# Patient Record
Sex: FEMALE | Race: WHITE | NOT HISPANIC OR LATINO | Employment: OTHER | URBAN - METROPOLITAN AREA
[De-identification: names, ages, dates, MRNs, and addresses within clinical notes are randomized per-mention and may not be internally consistent; named-entity substitution may affect disease eponyms.]

---

## 2022-12-07 RX ORDER — HYDROCHLOROTHIAZIDE 25 MG/1
25 TABLET ORAL DAILY
COMMUNITY

## 2022-12-07 RX ORDER — CARBIDOPA AND LEVODOPA 25; 100 MG/1; MG/1
3 TABLET ORAL 4 TIMES DAILY
COMMUNITY

## 2022-12-07 RX ORDER — ACETAMINOPHEN 500 MG
1000 TABLET ORAL EVERY 6 HOURS PRN
Status: ON HOLD | COMMUNITY
End: 2022-12-22

## 2022-12-07 RX ORDER — TURMERIC ROOT EXTRACT 500 MG
1 TABLET ORAL DAILY
COMMUNITY

## 2022-12-07 RX ORDER — GLATIRAMER ACETATE 20 MG/ML
20 INJECTION, SOLUTION SUBCUTANEOUS EVERY EVENING
COMMUNITY

## 2022-12-07 RX ORDER — BACLOFEN 10 MG/1
5-10 TABLET ORAL DAILY PRN
COMMUNITY

## 2022-12-07 RX ORDER — HYDROXYZINE PAMOATE 25 MG/1
25 CAPSULE ORAL 3 TIMES DAILY PRN
Status: ON HOLD | COMMUNITY
End: 2022-12-22

## 2022-12-07 RX ORDER — PRAMIPEXOLE DIHYDROCHLORIDE 0.12 MG/1
0.12 TABLET ORAL 3 TIMES DAILY
COMMUNITY

## 2022-12-07 RX ORDER — ZOLPIDEM TARTRATE 10 MG/1
10 TABLET ORAL AT BEDTIME
COMMUNITY

## 2022-12-07 RX ORDER — ATORVASTATIN CALCIUM 10 MG/1
10 TABLET, FILM COATED ORAL DAILY
COMMUNITY

## 2022-12-07 RX ORDER — SERTRALINE HYDROCHLORIDE 100 MG/1
100 TABLET, FILM COATED ORAL DAILY
COMMUNITY

## 2022-12-07 RX ORDER — AMLODIPINE BESYLATE 5 MG/1
5 TABLET ORAL DAILY
COMMUNITY

## 2022-12-07 RX ORDER — FLUTICASONE PROPIONATE 50 MCG
1 SPRAY, SUSPENSION (ML) NASAL DAILY
COMMUNITY

## 2022-12-07 RX ORDER — LOSARTAN POTASSIUM 50 MG/1
50 TABLET ORAL DAILY
COMMUNITY

## 2022-12-07 RX ORDER — OXYCODONE HYDROCHLORIDE 5 MG/1
5 CAPSULE ORAL EVERY 8 HOURS PRN
Status: ON HOLD | COMMUNITY
End: 2022-12-20

## 2022-12-07 RX ORDER — MULTIVITAMIN,THERAPEUTIC
1 TABLET ORAL DAILY
COMMUNITY

## 2022-12-07 RX ORDER — CARBIDOPA AND LEVODOPA 25; 100 MG/1; MG/1
1 TABLET, EXTENDED RELEASE ORAL AT BEDTIME
COMMUNITY

## 2022-12-07 RX ORDER — ALBUTEROL SULFATE 90 UG/1
2 AEROSOL, METERED RESPIRATORY (INHALATION) EVERY 4 HOURS PRN
COMMUNITY

## 2022-12-07 RX ORDER — VITAMIN E 268 MG
400 CAPSULE ORAL DAILY
COMMUNITY

## 2022-12-07 RX ORDER — IBUPROFEN 200 MG
400-600 TABLET ORAL EVERY 4 HOURS PRN
COMMUNITY

## 2022-12-07 RX ORDER — MONTELUKAST SODIUM 10 MG/1
10 TABLET ORAL AT BEDTIME
COMMUNITY

## 2022-12-07 RX ORDER — BECLOMETHASONE DIPROPIONATE HFA 80 UG/1
1 AEROSOL, METERED RESPIRATORY (INHALATION) 2 TIMES DAILY
COMMUNITY

## 2022-12-07 RX ORDER — BACLOFEN 10 MG/1
10 TABLET ORAL AT BEDTIME
COMMUNITY

## 2022-12-07 RX ORDER — ARMODAFINIL 50 MG/1
50 TABLET ORAL EVERY MORNING
COMMUNITY

## 2022-12-07 NOTE — PROVIDER NOTIFICATION
Planning to discharge home on POD 1 in the morning with her friend staying with her.       12/07/22 4807   Discharge Planning   Patient/Family Anticipates Transition to home with family  (She is currently planning on home with her friend, Nolvia, staying with her. Her first choice would be to do outpatient PT and it's arranged at [facility] in [place] in [place]. If she isn't moving as well as she plans, then she wants home care PT.)   Concerns to be Addressed all concerns addressed in this encounter   Living Arrangements   People in Home spouse  (Her  is on hospice and gets home care. The home care [employer] is trying to arrange a respite care stay at a nursing home for the  for after the surgery..)   Type of Residence Private Residence   Is your private residence a single family home or apartment? Single family home   Number of Stairs, Within Home, Primary two   Stair Railings, Within Home, Primary none   Once home, are you able to live on one level? Yes   Which level? Main Level   Bathroom Shower/Tub Tub/Shower unit   Equipment Currently Used at Home shower chair;grab bar, tub/shower;commode chair;walker, rolling;cane, straight   Support System   Support Systems Children;Friends/Neighbors  (daughter, Brenda Leiva, lives in the same triplex as the Pt. and an friend, Nolvia Espinoza, who is like a adopted daughter can stay with her after surgery.)   Do you have someone available to stay with you one or two nights once you are home? Yes   Education   Patient attended total joint pre-op class/received pre-op teaching  email/phone call

## 2022-12-08 NOTE — PROVIDER NOTIFICATION
Patient's BMI is 45.48. Goal BMI for elective total joint arthroplasty surgery is < 45. Dr. Arita notified of high BMI and is ok proceeding with surgery.    Puja Bond RN

## 2022-12-19 NOTE — TREATMENT PLAN
Orthopedic Surgery Pre-Op Plan: Kaylin Zaldivar  pre-op review. This is NOT an H&P   Surgeon: Dr. Arita   [facility]: Jasmy[facility]  Name of Surgery: Left Total Knee Arthroplasty  Date of Surgery: 12/20/22  H&P: Completed on 11/30/22 by Dr. Donte Dutta at [facility].   History of ASA, NSAIDS, vitamin and/or herbal supplements within 10 days: Yes- Ibuprofen, Vitamin E, Turmeric, Multivitamin-patient instructed to hold these medications/vitamins/supplements for 7 days before surgery.  History of blood thinners: No    Plan:   1) Discharge Plan: Home POD 1 with assist of Friend, Nolvia, who will be staying with her after surgery to help out. Daughter also lives in the same triplex. Please see Discharge Planning section near bottom of this note for further details.     2) Cough: Covid-19 test negative at preop exam on 11/30/22.  Patient was instructed to notify Dr. Arita's office if cough does not improve prior to surgery.     3) Skin Lesions: per PCP- patient had scattered erythematous nodular skin lesions with excoriations over bilateral knees at preop exam on 11/30/22. PCP encouraged patient to cover these lesions and avoid scratching or picking at them. Patient encouraged to discussed these lesions with Dr. Arita and to let her know if they open up, have drainage or appear infected.     4) Traumatic Brain Injury (TBI): with subarachnoid hemorrhage and subdural hematoma in 2019:     5) Multiple Sclerosis: Follows with Neurology. On glatiramer (Copaxone) injection.     6) Parkinson Disease: Follows with Neurology. On Sinemet, Mirapex and baclofen.     7) Hyperlipidemia: On atorvastatin.    8) Hypertension: Appears well controlled on amlodipine, hydrochlorothiazide, and losartan.  Patient instructed to hold hydrochlorothiazide and losartan on the morning of surgery but should continue taking amlodipine.    9) Mild Persistent Asthma: Stable.  Denies any recent exacerbations.  On  Singulair and albuterol inhaler as needed.    10) Prediabetes: A1c 6.2 on 8/3/2022.  Blood glucose 169 on 11/30/2022.  We will check updated hemoglobin A1c on day of surgery and monitor BG's closely during hospital stay.  Goal BG <180 to decrease risk for infection and postop wound healing complications.  If BG >180, nursing to please notify Hospitalist.    11) Morbid Obesity: BMI 45.4, Wt: 229 lbs at preop exam on 11/30/22. Goal BMI < 45 for elective total joint arthroplasty. Dr. Arita notified of high BMI and is ok proceeding.  I recommend continued efforts at safe weight loss following recovery from surgery. If patient is interested in further assistance with weight loss, please consider referral to [facility]. They offer both non-surgical and surgical evidence-based weight loss strategies. Call [phone] to schedule a consultation to learn more.      12) Adjustment Disorder with mixed anxiety and depressed mood: On sertraline.     13) GERD: On omeprazole.     Patient appears medically optimized for upcoming surgery as long as skin lesions improve prior to surgery. I would recommend Hospitalist Consult to assist with medical management. Please call me below with any questions on this patient.     Review of Systems Notable for: Cough-negative COVID-19 test on 11/30/2022, skin lesions-nodular lesions over bilateral knees, traumatic brain injury with history of subarachnoid hemorrhage and subdermal hematoma, multiple sclerosis, Parkinson's disease, hyperlipidemia, hypertension, mild persistent asthma, prediabetes, morbid obesity, adjustment disorder with mixed anxiety and depressed mood, GERD.    Past Medical History:   Past Medical History:   Diagnosis Date     Adjustment disorder with mixed anxiety and depressed mood      Arthritis      Dysphonia      Gastroesophageal reflux disease      Hyperlipidemia      Hypertension      Multiple sclerosis (H)      Obese       Parkinsons disease (H)      PONV (postoperative nausea and vomiting)      Prediabetes      SAH (subarachnoid hemorrhage) (H) 2019     Spasticity      Subdural hematoma 2019     TBI (traumatic brain injury) 2019     Uncomplicated asthma      Vertigo      Past Surgical History:   Procedure Laterality Date     BLADDER SURGERY  1978     BUNIONECTOMY Left     with bone spur removal      SECTION  1974     DILATION AND CURETTAGE  1981     HYSTERECTOMY, UDAY  1998     MAMMOPLASTY REDUCTION  1999     MENISCECTOMY Right 2007     REPAIR HAMMER TOE Right 2020     SALPINGECTOMY  1990     SALPINGO-OOPHORECTOMY BILATERAL       TONSILLECTOMY  1979       Current Medications:  Patient's Medications   New Prescriptions    No medications on file   Previous Medications    ACETAMINOPHEN (TYLENOL) 500 MG TABLET    Take 1,000 mg by mouth every 6 hours as needed for mild pain    ALBUTEROL (PROAIR HFA/PROVENTIL HFA/VENTOLIN HFA) 108 (90 BASE) MCG/ACT INHALER    Inhale 2 puffs into the lungs every 4 hours as needed for shortness of breath / dyspnea or wheezing    AMLODIPINE (NORVASC) 5 MG TABLET    Take 5 mg by mouth daily    ARMODAFINIL (NUVIGIL) 50 MG TABS TABLET    Take 50 mg by mouth every morning    ATORVASTATIN (LIPITOR) 10 MG TABLET    Take 10 mg by mouth daily    BACLOFEN (LIORESAL) 10 MG TABLET    Take 20 mg by mouth At Bedtime For MS    BACLOFEN (LIORESAL) 10 MG TABLET    Take 5-10 mg by mouth daily as needed (PRN during the daytime hours)    BECLOMETHASONE HFA (QVAR REDIHALER) 80 MCG/ACT INHALER    Inhale 1 puff into the lungs 2 times daily    CALCIUM-MAGNESIUM-ZINC 333-133-5 MG TABS PER TABLET    Take 1 tablet by mouth daily    CARBIDOPA-LEVODOPA (SINEMET CR)  MG CR TABLET    Take 1 tablet by mouth At Bedtime    CARBIDOPA-LEVODOPA (SINEMET)  MG TABLET    Take 3 tablets by mouth 4 times daily Takes about 4 hours apart    FLUTICASONE (FLONASE) 50 MCG/ACT NASAL SPRAY    Spray 1 spray into both  nostrils daily    GLATIRAMER (COPAXONE) 20 MG/ML INJECTION    Inject 20 mg Subcutaneous daily    HYDROCHLOROTHIAZIDE (HYDRODIURIL) 25 MG TABLET    Take 25 mg by mouth daily    HYDROXYZINE (VISTARIL) 25 MG CAPSULE    Take 25 mg by mouth 3 times daily as needed    IBUPROFEN (ADVIL/MOTRIN) 200 MG TABLET    Take 400-600 mg by mouth every 4 hours as needed for pain Stopping 12/14/22 before surgery    LOSARTAN (COZAAR) 50 MG TABLET    Take 50 mg by mouth daily    MELATONIN 5 MG TABLET    Take 10 mg by mouth nightly as needed    MONTELUKAST (SINGULAIR) 10 MG TABLET    Take 10 mg by mouth At Bedtime    MULTIVITAMIN, THERAPEUTIC (THERA-VIT) TABS TABLET    Take 1 tablet by mouth daily Stopping 12/12/22 before surgery    OMEPRAZOLE (PRILOSEC) 20 MG DR CAPSULE    Take 20 mg by mouth daily    OXYCODONE (OXY-IR) 5 MG CAPSULE    Take 5 mg by mouth every 8 hours as needed for severe pain (7-10)    PRAMIPEXOLE (MIRAPEX) 0.125 MG TABLET    Take 0.125 mg by mouth 3 times daily    SERTRALINE (ZOLOFT) 100 MG TABLET    Take 100 mg by mouth daily    TURMERIC 500 MG TABS    Take 1 tablet by mouth daily Stopping 12/12/22 before surgery    VITAMIN D3 (CHOLECALCIFEROL) 125 MCG (5000 UT) TABLET    Take 1 tablet by mouth daily    VITAMIN E (TOCOPHEROL) 400 UNITS (180 MG) CAPSULE    Take 400 Units by mouth daily Stopping 12/12/22 before surgery    ZOLPIDEM (AMBIEN) 10 MG TABLET    Take 10 mg by mouth nightly as needed for sleep   Modified Medications    No medications on file   Discontinued Medications    No medications on file       ALLERGIES:  Allergies   Allergen Reactions     Aleve [Naproxen]      Due to asthma, per MD recommendation     Erythromycin GI Disturbance     Eszopiclone Other (See Comments)     Hallucinations and metallic taste in mouth     Hydrocodone GI Disturbance     Melon Swelling     Swelling in throat from honeydew melon only     Penicillins GI Disturbance     Teriflunomide Other (See Comments) and Nausea and Vomiting      Abdominal and thoracic pain     Benzodiazepines Rash     Sulfamethoxazole Rash     Sulfa antibiotics     Trazodone Rash       Social History  Social History     Tobacco Use     Smoking status: Never     Smokeless tobacco: Never   Substance Use Topics     Alcohol use: Not Currently     Drug use: Never       Any Abnormal Recent Diagnostics? Yes  Potassium 3.4 on 11/30/2022: Mild hypokalemia.  We will recheck potassium on day of surgery and can monitor postop as needed.  Hemoglobin A1c 6.3 on 8/3/2022: In prediabetes range.  We will check updated hemoglobin A1c on day of surgery.  Blood glucose 169 on 11/30/2022: History of prediabetes.  We will monitor BG's closely during hospital stay.  Goal BG <180 to decrease risk for infection and postop wound healing complications.      Discharge Planning:   Planning to discharge home on POD 1 in the morning with her friend staying with her.        12/07/22 4834   Discharge Planning   Patient/Family Anticipates Transition to home with family  (She is currently planning on home with her friend, Nolvia, staying with her. Her first choice would be to do outpatient PT and it's arranged at [facility] [place] in [place]. If she isn't moving as well as she plans, then she wants home care PT.)   Concerns to be Addressed all concerns addressed in this encounter   Living Arrangements   People in Home spouse  (Her  is on hospice and gets home care. The home care company is trying to arrange a respite care stay at a nursing home for the  for after the surgery..)   Type of Residence Private Residence   Is your private residence a single family home or apartment? Single family home   Number of Stairs, Within Home, Primary two   Stair Railings, Within Home, Primary none   Once home, are you able to live on one level? Yes   Which level? Main Level   Bathroom Shower/Tub Tub/Shower unit   Equipment Currently Used at Home shower chair;grab bar, tub/shower;commode chair;walker, rolling;cane,  [facility]   Support System   Support Systems Children;Friends/Neighbors  (daughter, Brenda Leiva, lives in the same triplex as the Pt. and an friend, Nolvia Espinoza, who is like a adopted daughter can stay with her after surgery.)   Do you have someone available to stay with you one or two nights once you are home? Yes   Education   Patient attended total joint pre-op class/received pre-op teaching  email/phone call       SHOSHANA Rebolledo, CNP   Advanced Practice Nurse Navigator- Orthopedics  [facility]   Phone: [phone]

## 2022-12-20 ENCOUNTER — HOSPITAL ENCOUNTER (OUTPATIENT)
Facility: CLINIC | Age: 67
Discharge: HOME | End: 2022-12-22
Attending: ORTHOPAEDIC SURGERY | Admitting: ORTHOPAEDIC SURGERY
Payer: MEDICARE

## 2022-12-20 ENCOUNTER — ANESTHESIA EVENT (OUTPATIENT)
Dept: SURGERY | Facility: CLINIC | Age: 67
End: 2022-12-20
Payer: MEDICARE

## 2022-12-20 ENCOUNTER — ANESTHESIA (OUTPATIENT)
Dept: SURGERY | Facility: CLINIC | Age: 67
End: 2022-12-20
Payer: MEDICARE

## 2022-12-20 DIAGNOSIS — E66.2 OBESITY HYPOVENTILATION SYNDROME (H): ICD-10-CM

## 2022-12-20 DIAGNOSIS — M17.12 PRIMARY OSTEOARTHRITIS OF LEFT KNEE: Primary | ICD-10-CM

## 2022-12-20 LAB
CREAT SERPL-MCNC: 0.63 MG/DL (ref 0.6–1.1)
ERYTHROCYTE [DISTWIDTH] IN BLOOD BY AUTOMATED COUNT: 13.2 % (ref 10–15)
GFR SERPL CREATININE-BSD FRML MDRD: >90 ML/MIN/1.73M2
GLUCOSE BLDC GLUCOMTR-MCNC: 155 MG/DL (ref 70–99)
HBA1C MFR BLD: 6.2 %
HCT VFR BLD AUTO: 42.7 % (ref 35–47)
HGB BLD-MCNC: 13.7 G/DL (ref 11.7–15.7)
MCH RBC QN AUTO: 27.5 PG (ref 26.5–33)
MCHC RBC AUTO-ENTMCNC: 32.1 G/DL (ref 31.5–36.5)
MCV RBC AUTO: 86 FL (ref 78–100)
PLATELET # BLD AUTO: 263 10E3/UL (ref 150–450)
POTASSIUM BLD-SCNC: 3.3 MMOL/L (ref 3.5–5)
RBC # BLD AUTO: 4.99 10E6/UL (ref 3.8–5.2)
WBC # BLD AUTO: 10.4 10E3/UL (ref 4–11)

## 2022-12-20 PROCEDURE — 258N000003 HC RX IP 258 OP 636: Performed by: NURSE ANESTHETIST, CERTIFIED REGISTERED

## 2022-12-20 PROCEDURE — 999N000141 HC STATISTIC PRE-PROCEDURE NURSING ASSESSMENT: Performed by: ORTHOPAEDIC SURGERY

## 2022-12-20 PROCEDURE — 36415 COLL VENOUS BLD VENIPUNCTURE: CPT | Performed by: PHYSICIAN ASSISTANT

## 2022-12-20 PROCEDURE — 250N000011 HC RX IP 250 OP 636: Performed by: ANESTHESIOLOGY

## 2022-12-20 PROCEDURE — 84132 ASSAY OF SERUM POTASSIUM: CPT | Performed by: PHYSICIAN ASSISTANT

## 2022-12-20 PROCEDURE — 272N000001 HC OR GENERAL SUPPLY STERILE: Performed by: ORTHOPAEDIC SURGERY

## 2022-12-20 PROCEDURE — C1776 JOINT DEVICE (IMPLANTABLE): HCPCS | Performed by: ORTHOPAEDIC SURGERY

## 2022-12-20 PROCEDURE — 250N000009 HC RX 250: Performed by: ANESTHESIOLOGY

## 2022-12-20 PROCEDURE — 250N000013 HC RX MED GY IP 250 OP 250 PS 637: Performed by: ORTHOPAEDIC SURGERY

## 2022-12-20 PROCEDURE — 250N000009 HC RX 250: Performed by: PHYSICIAN ASSISTANT

## 2022-12-20 PROCEDURE — 250N000011 HC RX IP 250 OP 636: Performed by: PHYSICIAN ASSISTANT

## 2022-12-20 PROCEDURE — 250N000011 HC RX IP 250 OP 636: Performed by: ORTHOPAEDIC SURGERY

## 2022-12-20 PROCEDURE — 83036 HEMOGLOBIN GLYCOSYLATED A1C: CPT | Performed by: NURSE PRACTITIONER

## 2022-12-20 PROCEDURE — 82962 GLUCOSE BLOOD TEST: CPT

## 2022-12-20 PROCEDURE — 258N000003 HC RX IP 258 OP 636: Performed by: ANESTHESIOLOGY

## 2022-12-20 PROCEDURE — 360N000077 HC SURGERY LEVEL 4, PER MIN: Performed by: ORTHOPAEDIC SURGERY

## 2022-12-20 PROCEDURE — 250N000009 HC RX 250: Performed by: NURSE ANESTHETIST, CERTIFIED REGISTERED

## 2022-12-20 PROCEDURE — 250N000013 HC RX MED GY IP 250 OP 250 PS 637: Performed by: ANESTHESIOLOGY

## 2022-12-20 PROCEDURE — 250N000011 HC RX IP 250 OP 636: Performed by: NURSE ANESTHETIST, CERTIFIED REGISTERED

## 2022-12-20 PROCEDURE — 250N000025 HC SEVOFLURANE, PER MIN: Performed by: ORTHOPAEDIC SURGERY

## 2022-12-20 PROCEDURE — 370N000017 HC ANESTHESIA TECHNICAL FEE, PER MIN: Performed by: ORTHOPAEDIC SURGERY

## 2022-12-20 PROCEDURE — 710N000010 HC RECOVERY PHASE 1, LEVEL 2, PER MIN: Performed by: ORTHOPAEDIC SURGERY

## 2022-12-20 PROCEDURE — 85027 COMPLETE CBC AUTOMATED: CPT | Performed by: PHYSICIAN ASSISTANT

## 2022-12-20 PROCEDURE — 258N000003 HC RX IP 258 OP 636: Performed by: ORTHOPAEDIC SURGERY

## 2022-12-20 PROCEDURE — 82565 ASSAY OF CREATININE: CPT | Performed by: PHYSICIAN ASSISTANT

## 2022-12-20 PROCEDURE — C1713 ANCHOR/SCREW BN/BN,TIS/BN: HCPCS | Performed by: ORTHOPAEDIC SURGERY

## 2022-12-20 PROCEDURE — 250N000013 HC RX MED GY IP 250 OP 250 PS 637: Performed by: PHYSICIAN ASSISTANT

## 2022-12-20 DEVICE — ATTUNE PATELLA MEDIALIZED DOME 32MM CEMENTED AOX
Type: IMPLANTABLE DEVICE | Site: KNEE | Status: FUNCTIONAL
Brand: ATTUNE

## 2022-12-20 DEVICE — ATTUNE KNEE SYSTEM TIBIAL BASE FIXED BEARING SIZE 3 CEMENTED
Type: IMPLANTABLE DEVICE | Site: KNEE | Status: FUNCTIONAL
Brand: ATTUNE

## 2022-12-20 DEVICE — SPEEDSET FULL DOSE ANTIBIOTIC BONE CEMENT, 10 PACK CATALOG NUMBER IS 6192-1-010
Type: IMPLANTABLE DEVICE | Site: KNEE | Status: FUNCTIONAL
Brand: SIMPLEX

## 2022-12-20 DEVICE — ATTUNE KNEE SYSTEM FEMORAL CRUCIATE RETAINING SIZE 3 LEFT CEMENTED
Type: IMPLANTABLE DEVICE | Site: KNEE | Status: FUNCTIONAL
Brand: ATTUNE

## 2022-12-20 DEVICE — ATTUNE KNEE SYSTEM TIBIAL INSERT FIXED BEARING CRUCIATE RETAINING 3 7MM AOX
Type: IMPLANTABLE DEVICE | Site: KNEE | Status: FUNCTIONAL
Brand: ATTUNE

## 2022-12-20 RX ORDER — LIDOCAINE 40 MG/G
CREAM TOPICAL
Status: DISCONTINUED | OUTPATIENT
Start: 2022-12-20 | End: 2022-12-20 | Stop reason: HOSPADM

## 2022-12-20 RX ORDER — HYDROMORPHONE HCL IN WATER/PF 6 MG/30 ML
0.4 PATIENT CONTROLLED ANALGESIA SYRINGE INTRAVENOUS
Status: DISCONTINUED | OUTPATIENT
Start: 2022-12-20 | End: 2022-12-22 | Stop reason: HOSPADM

## 2022-12-20 RX ORDER — ACETAMINOPHEN 325 MG/1
650 TABLET ORAL EVERY 4 HOURS PRN
Status: DISCONTINUED | OUTPATIENT
Start: 2022-12-23 | End: 2022-12-22 | Stop reason: HOSPADM

## 2022-12-20 RX ORDER — BACLOFEN 10 MG/1
10 TABLET ORAL AT BEDTIME
Status: DISCONTINUED | OUTPATIENT
Start: 2022-12-20 | End: 2022-12-22 | Stop reason: HOSPADM

## 2022-12-20 RX ORDER — ALBUTEROL SULFATE 90 UG/1
2 AEROSOL, METERED RESPIRATORY (INHALATION) EVERY 4 HOURS PRN
Status: DISCONTINUED | OUTPATIENT
Start: 2022-12-20 | End: 2022-12-22 | Stop reason: HOSPADM

## 2022-12-20 RX ORDER — CARBIDOPA AND LEVODOPA 25; 100 MG/1; MG/1
3 TABLET ORAL 4 TIMES DAILY
Status: DISCONTINUED | OUTPATIENT
Start: 2022-12-21 | End: 2022-12-22 | Stop reason: HOSPADM

## 2022-12-20 RX ORDER — AMOXICILLIN 250 MG
1 CAPSULE ORAL 2 TIMES DAILY
Status: DISCONTINUED | OUTPATIENT
Start: 2022-12-20 | End: 2022-12-22 | Stop reason: HOSPADM

## 2022-12-20 RX ORDER — ONDANSETRON 2 MG/ML
4 INJECTION INTRAMUSCULAR; INTRAVENOUS EVERY 30 MIN PRN
Status: DISCONTINUED | OUTPATIENT
Start: 2022-12-20 | End: 2022-12-20 | Stop reason: HOSPADM

## 2022-12-20 RX ORDER — NALOXONE HYDROCHLORIDE 0.4 MG/ML
0.2 INJECTION, SOLUTION INTRAMUSCULAR; INTRAVENOUS; SUBCUTANEOUS
Status: DISCONTINUED | OUTPATIENT
Start: 2022-12-20 | End: 2022-12-22 | Stop reason: HOSPADM

## 2022-12-20 RX ORDER — AMOXICILLIN 250 MG
1-2 CAPSULE ORAL 2 TIMES DAILY
Qty: 30 TABLET | Refills: 0 | Status: SHIPPED | OUTPATIENT
Start: 2022-12-20

## 2022-12-20 RX ORDER — ACETAMINOPHEN 325 MG/1
975 TABLET ORAL EVERY 8 HOURS
Status: DISCONTINUED | OUTPATIENT
Start: 2022-12-20 | End: 2022-12-22 | Stop reason: HOSPADM

## 2022-12-20 RX ORDER — OXYCODONE HYDROCHLORIDE 5 MG/1
5-10 TABLET ORAL EVERY 4 HOURS PRN
Qty: 33 TABLET | Refills: 0 | Status: SHIPPED | OUTPATIENT
Start: 2022-12-20

## 2022-12-20 RX ORDER — HYDROXYZINE HYDROCHLORIDE 25 MG/1
25 TABLET, FILM COATED ORAL 3 TIMES DAILY PRN
Status: DISCONTINUED | OUTPATIENT
Start: 2022-12-20 | End: 2022-12-22 | Stop reason: HOSPADM

## 2022-12-20 RX ORDER — BACLOFEN 10 MG/1
5-10 TABLET ORAL DAILY PRN
Status: DISCONTINUED | OUTPATIENT
Start: 2022-12-20 | End: 2022-12-22 | Stop reason: HOSPADM

## 2022-12-20 RX ORDER — DEXAMETHASONE SODIUM PHOSPHATE 10 MG/ML
INJECTION, SOLUTION INTRAMUSCULAR; INTRAVENOUS PRN
Status: DISCONTINUED | OUTPATIENT
Start: 2022-12-20 | End: 2022-12-20

## 2022-12-20 RX ORDER — PROPOFOL 10 MG/ML
INJECTION, EMULSION INTRAVENOUS PRN
Status: DISCONTINUED | OUTPATIENT
Start: 2022-12-20 | End: 2022-12-20

## 2022-12-20 RX ORDER — DEXMEDETOMIDINE HYDROCHLORIDE 4 UG/ML
INJECTION, SOLUTION INTRAVENOUS PRN
Status: DISCONTINUED | OUTPATIENT
Start: 2022-12-20 | End: 2022-12-20

## 2022-12-20 RX ORDER — HYDROMORPHONE HCL IN WATER/PF 6 MG/30 ML
0.4 PATIENT CONTROLLED ANALGESIA SYRINGE INTRAVENOUS EVERY 5 MIN PRN
Status: DISCONTINUED | OUTPATIENT
Start: 2022-12-20 | End: 2022-12-20 | Stop reason: HOSPADM

## 2022-12-20 RX ORDER — CALCIUM CARBONATE 500(1250)
500 TABLET ORAL DAILY
Status: DISCONTINUED | OUTPATIENT
Start: 2022-12-21 | End: 2022-12-22 | Stop reason: HOSPADM

## 2022-12-20 RX ORDER — OXYCODONE HYDROCHLORIDE 5 MG/1
10 TABLET ORAL EVERY 4 HOURS PRN
Status: DISCONTINUED | OUTPATIENT
Start: 2022-12-20 | End: 2022-12-20 | Stop reason: HOSPADM

## 2022-12-20 RX ORDER — NALOXONE HYDROCHLORIDE 0.4 MG/ML
0.4 INJECTION, SOLUTION INTRAMUSCULAR; INTRAVENOUS; SUBCUTANEOUS
Status: DISCONTINUED | OUTPATIENT
Start: 2022-12-20 | End: 2022-12-22 | Stop reason: HOSPADM

## 2022-12-20 RX ORDER — HYDROMORPHONE HCL IN WATER/PF 6 MG/30 ML
0.2 PATIENT CONTROLLED ANALGESIA SYRINGE INTRAVENOUS
Status: DISCONTINUED | OUTPATIENT
Start: 2022-12-20 | End: 2022-12-22 | Stop reason: HOSPADM

## 2022-12-20 RX ORDER — SODIUM CHLORIDE, SODIUM LACTATE, POTASSIUM CHLORIDE, CALCIUM CHLORIDE 600; 310; 30; 20 MG/100ML; MG/100ML; MG/100ML; MG/100ML
INJECTION, SOLUTION INTRAVENOUS CONTINUOUS
Status: DISCONTINUED | OUTPATIENT
Start: 2022-12-20 | End: 2022-12-20 | Stop reason: HOSPADM

## 2022-12-20 RX ORDER — GLATIRAMER ACETATE 20 MG/ML
20 INJECTION, SOLUTION SUBCUTANEOUS EVERY EVENING
Status: DISCONTINUED | OUTPATIENT
Start: 2022-12-20 | End: 2022-12-22 | Stop reason: HOSPADM

## 2022-12-20 RX ORDER — SODIUM CHLORIDE, SODIUM LACTATE, POTASSIUM CHLORIDE, CALCIUM CHLORIDE 600; 310; 30; 20 MG/100ML; MG/100ML; MG/100ML; MG/100ML
INJECTION, SOLUTION INTRAVENOUS CONTINUOUS
Status: DISCONTINUED | OUTPATIENT
Start: 2022-12-20 | End: 2022-12-22 | Stop reason: HOSPADM

## 2022-12-20 RX ORDER — HYDROCHLOROTHIAZIDE 25 MG/1
25 TABLET ORAL DAILY
Status: DISCONTINUED | OUTPATIENT
Start: 2022-12-20 | End: 2022-12-22 | Stop reason: HOSPADM

## 2022-12-20 RX ORDER — TRANEXAMIC ACID 650 MG/1
1950 TABLET ORAL ONCE
Status: COMPLETED | OUTPATIENT
Start: 2022-12-20 | End: 2022-12-20

## 2022-12-20 RX ORDER — CARBIDOPA AND LEVODOPA 25; 100 MG/1; MG/1
1 TABLET, EXTENDED RELEASE ORAL AT BEDTIME
Status: DISCONTINUED | OUTPATIENT
Start: 2022-12-20 | End: 2022-12-22 | Stop reason: HOSPADM

## 2022-12-20 RX ORDER — FLUTICASONE PROPIONATE 50 MCG
1 SPRAY, SUSPENSION (ML) NASAL DAILY
Status: DISCONTINUED | OUTPATIENT
Start: 2022-12-20 | End: 2022-12-22 | Stop reason: HOSPADM

## 2022-12-20 RX ORDER — CEFAZOLIN SODIUM/WATER 2 G/20 ML
2 SYRINGE (ML) INTRAVENOUS
Status: COMPLETED | OUTPATIENT
Start: 2022-12-20 | End: 2022-12-20

## 2022-12-20 RX ORDER — METHOCARBAMOL 500 MG/1
500 TABLET, FILM COATED ORAL 4 TIMES DAILY
Status: DISCONTINUED | OUTPATIENT
Start: 2022-12-20 | End: 2022-12-20

## 2022-12-20 RX ORDER — ONDANSETRON 2 MG/ML
4 INJECTION INTRAMUSCULAR; INTRAVENOUS EVERY 6 HOURS PRN
Status: DISCONTINUED | OUTPATIENT
Start: 2022-12-20 | End: 2022-12-22 | Stop reason: HOSPADM

## 2022-12-20 RX ORDER — OXYCODONE HYDROCHLORIDE 5 MG/1
5 TABLET ORAL EVERY 4 HOURS PRN
Status: DISCONTINUED | OUTPATIENT
Start: 2022-12-20 | End: 2022-12-22 | Stop reason: HOSPADM

## 2022-12-20 RX ORDER — FENTANYL CITRATE 50 UG/ML
50 INJECTION, SOLUTION INTRAMUSCULAR; INTRAVENOUS EVERY 5 MIN PRN
Status: DISCONTINUED | OUTPATIENT
Start: 2022-12-20 | End: 2022-12-20 | Stop reason: HOSPADM

## 2022-12-20 RX ORDER — AMLODIPINE BESYLATE 5 MG/1
5 TABLET ORAL DAILY
Status: DISCONTINUED | OUTPATIENT
Start: 2022-12-20 | End: 2022-12-22 | Stop reason: HOSPADM

## 2022-12-20 RX ORDER — PRAMIPEXOLE DIHYDROCHLORIDE 0.12 MG/1
0.12 TABLET ORAL 3 TIMES DAILY
Status: DISCONTINUED | OUTPATIENT
Start: 2022-12-20 | End: 2022-12-22 | Stop reason: HOSPADM

## 2022-12-20 RX ORDER — BISACODYL 10 MG
10 SUPPOSITORY, RECTAL RECTAL DAILY PRN
Status: DISCONTINUED | OUTPATIENT
Start: 2022-12-20 | End: 2022-12-22 | Stop reason: HOSPADM

## 2022-12-20 RX ORDER — FENTANYL CITRATE 50 UG/ML
25 INJECTION, SOLUTION INTRAMUSCULAR; INTRAVENOUS
Status: DISCONTINUED | OUTPATIENT
Start: 2022-12-20 | End: 2022-12-20 | Stop reason: HOSPADM

## 2022-12-20 RX ORDER — LOSARTAN POTASSIUM 50 MG/1
50 TABLET ORAL DAILY
Status: DISCONTINUED | OUTPATIENT
Start: 2022-12-20 | End: 2022-12-22 | Stop reason: HOSPADM

## 2022-12-20 RX ORDER — FENTANYL CITRATE 50 UG/ML
25-100 INJECTION, SOLUTION INTRAMUSCULAR; INTRAVENOUS
Status: DISCONTINUED | OUTPATIENT
Start: 2022-12-20 | End: 2022-12-20 | Stop reason: HOSPADM

## 2022-12-20 RX ORDER — METHOCARBAMOL 500 MG/1
500 TABLET, FILM COATED ORAL ONCE
Status: COMPLETED | OUTPATIENT
Start: 2022-12-20 | End: 2022-12-20

## 2022-12-20 RX ORDER — ACETAMINOPHEN 325 MG/1
650 TABLET ORAL EVERY 4 HOURS PRN
Qty: 100 TABLET | Refills: 0 | Status: SHIPPED | OUTPATIENT
Start: 2022-12-20

## 2022-12-20 RX ORDER — LIDOCAINE 40 MG/G
CREAM TOPICAL
Status: DISCONTINUED | OUTPATIENT
Start: 2022-12-20 | End: 2022-12-22 | Stop reason: HOSPADM

## 2022-12-20 RX ORDER — ONDANSETRON 4 MG/1
4 TABLET, ORALLY DISINTEGRATING ORAL EVERY 6 HOURS PRN
Status: DISCONTINUED | OUTPATIENT
Start: 2022-12-20 | End: 2022-12-22 | Stop reason: HOSPADM

## 2022-12-20 RX ORDER — CEFAZOLIN SODIUM 2 G/100ML
2 INJECTION, SOLUTION INTRAVENOUS EVERY 8 HOURS
Status: COMPLETED | OUTPATIENT
Start: 2022-12-20 | End: 2022-12-21

## 2022-12-20 RX ORDER — FENTANYL CITRATE 50 UG/ML
INJECTION, SOLUTION INTRAMUSCULAR; INTRAVENOUS PRN
Status: DISCONTINUED | OUTPATIENT
Start: 2022-12-20 | End: 2022-12-20

## 2022-12-20 RX ORDER — LIDOCAINE HYDROCHLORIDE 10 MG/ML
INJECTION, SOLUTION INFILTRATION; PERINEURAL PRN
Status: DISCONTINUED | OUTPATIENT
Start: 2022-12-20 | End: 2022-12-20

## 2022-12-20 RX ORDER — ASPIRIN 81 MG/1
81 TABLET ORAL 2 TIMES DAILY
Status: DISCONTINUED | OUTPATIENT
Start: 2022-12-20 | End: 2022-12-22 | Stop reason: HOSPADM

## 2022-12-20 RX ORDER — OXYCODONE HYDROCHLORIDE 5 MG/1
10 TABLET ORAL EVERY 4 HOURS PRN
Status: DISCONTINUED | OUTPATIENT
Start: 2022-12-20 | End: 2022-12-22 | Stop reason: HOSPADM

## 2022-12-20 RX ORDER — OXYCODONE HYDROCHLORIDE 5 MG/1
5 TABLET ORAL EVERY 8 HOURS PRN
Status: ON HOLD | COMMUNITY
End: 2022-12-22

## 2022-12-20 RX ORDER — PROCHLORPERAZINE MALEATE 5 MG
5 TABLET ORAL EVERY 6 HOURS PRN
Status: DISCONTINUED | OUTPATIENT
Start: 2022-12-20 | End: 2022-12-22 | Stop reason: HOSPADM

## 2022-12-20 RX ORDER — ONDANSETRON 4 MG/1
4 TABLET, ORALLY DISINTEGRATING ORAL EVERY 30 MIN PRN
Status: DISCONTINUED | OUTPATIENT
Start: 2022-12-20 | End: 2022-12-20 | Stop reason: HOSPADM

## 2022-12-20 RX ORDER — HYDRALAZINE HYDROCHLORIDE 20 MG/ML
INJECTION INTRAMUSCULAR; INTRAVENOUS PRN
Status: DISCONTINUED | OUTPATIENT
Start: 2022-12-20 | End: 2022-12-20

## 2022-12-20 RX ORDER — ARMODAFINIL 50 MG/1
50 TABLET ORAL EVERY MORNING
Status: DISCONTINUED | OUTPATIENT
Start: 2022-12-21 | End: 2022-12-22 | Stop reason: HOSPADM

## 2022-12-20 RX ORDER — ONDANSETRON 2 MG/ML
INJECTION INTRAMUSCULAR; INTRAVENOUS PRN
Status: DISCONTINUED | OUTPATIENT
Start: 2022-12-20 | End: 2022-12-20

## 2022-12-20 RX ORDER — PROPOFOL 10 MG/ML
INJECTION, EMULSION INTRAVENOUS CONTINUOUS PRN
Status: DISCONTINUED | OUTPATIENT
Start: 2022-12-20 | End: 2022-12-20

## 2022-12-20 RX ORDER — CEFAZOLIN SODIUM/WATER 2 G/20 ML
2 SYRINGE (ML) INTRAVENOUS SEE ADMIN INSTRUCTIONS
Status: DISCONTINUED | OUTPATIENT
Start: 2022-12-20 | End: 2022-12-20 | Stop reason: HOSPADM

## 2022-12-20 RX ORDER — ASPIRIN 81 MG/1
81 TABLET ORAL 2 TIMES DAILY
Qty: 60 TABLET | Refills: 0 | Status: SHIPPED | OUTPATIENT
Start: 2022-12-20

## 2022-12-20 RX ORDER — HYDROMORPHONE HCL IN WATER/PF 6 MG/30 ML
0.2 PATIENT CONTROLLED ANALGESIA SYRINGE INTRAVENOUS EVERY 5 MIN PRN
Status: DISCONTINUED | OUTPATIENT
Start: 2022-12-20 | End: 2022-12-20 | Stop reason: HOSPADM

## 2022-12-20 RX ORDER — FENTANYL CITRATE 50 UG/ML
25 INJECTION, SOLUTION INTRAMUSCULAR; INTRAVENOUS EVERY 5 MIN PRN
Status: DISCONTINUED | OUTPATIENT
Start: 2022-12-20 | End: 2022-12-20 | Stop reason: HOSPADM

## 2022-12-20 RX ORDER — HYDROXYZINE HYDROCHLORIDE 10 MG/1
10 TABLET, FILM COATED ORAL EVERY 6 HOURS PRN
Status: DISCONTINUED | OUTPATIENT
Start: 2022-12-20 | End: 2022-12-22 | Stop reason: HOSPADM

## 2022-12-20 RX ORDER — MONTELUKAST SODIUM 10 MG/1
10 TABLET ORAL AT BEDTIME
Status: DISCONTINUED | OUTPATIENT
Start: 2022-12-20 | End: 2022-12-22 | Stop reason: HOSPADM

## 2022-12-20 RX ORDER — POLYETHYLENE GLYCOL 3350 17 G/17G
17 POWDER, FOR SOLUTION ORAL DAILY
Status: DISCONTINUED | OUTPATIENT
Start: 2022-12-21 | End: 2022-12-22 | Stop reason: HOSPADM

## 2022-12-20 RX ORDER — OXYCODONE HYDROCHLORIDE 5 MG/1
5 TABLET ORAL EVERY 4 HOURS PRN
Status: DISCONTINUED | OUTPATIENT
Start: 2022-12-20 | End: 2022-12-20 | Stop reason: HOSPADM

## 2022-12-20 RX ADMIN — LIDOCAINE HYDROCHLORIDE 20 MG: 10 INJECTION, SOLUTION INFILTRATION; PERINEURAL at 13:51

## 2022-12-20 RX ADMIN — BUPIVACAINE HYDROCHLORIDE AND EPINEPHRINE BITARTRATE 20 ML: 5; .005 INJECTION, SOLUTION EPIDURAL; INTRACAUDAL; PERINEURAL at 12:42

## 2022-12-20 RX ADMIN — Medication 2 G: at 13:46

## 2022-12-20 RX ADMIN — HYDROMORPHONE HYDROCHLORIDE 0.5 MG: 1 INJECTION, SOLUTION INTRAMUSCULAR; INTRAVENOUS; SUBCUTANEOUS at 14:20

## 2022-12-20 RX ADMIN — PRAMIPEXOLE DIHYDROCHLORIDE 0.12 MG: 0.12 TABLET ORAL at 21:28

## 2022-12-20 RX ADMIN — ONDANSETRON 4 MG: 2 INJECTION INTRAMUSCULAR; INTRAVENOUS at 14:04

## 2022-12-20 RX ADMIN — ROCURONIUM BROMIDE 10 MG: 10 INJECTION, SOLUTION INTRAVENOUS at 14:44

## 2022-12-20 RX ADMIN — CEFAZOLIN SODIUM 2 G: 2 INJECTION, SOLUTION INTRAVENOUS at 21:52

## 2022-12-20 RX ADMIN — FENTANYL CITRATE 100 MCG: 50 INJECTION, SOLUTION INTRAMUSCULAR; INTRAVENOUS at 13:51

## 2022-12-20 RX ADMIN — CARBIDOPA AND LEVODOPA 1 TABLET: 25; 100 TABLET, EXTENDED RELEASE ORAL at 21:27

## 2022-12-20 RX ADMIN — TRANEXAMIC ACID 1950 MG: 650 TABLET ORAL at 11:23

## 2022-12-20 RX ADMIN — FENTANYL CITRATE 50 MCG: 50 INJECTION INTRAMUSCULAR; INTRAVENOUS at 15:30

## 2022-12-20 RX ADMIN — OXYCODONE HYDROCHLORIDE 5 MG: 5 TABLET ORAL at 17:31

## 2022-12-20 RX ADMIN — METHOCARBAMOL 500 MG: 500 TABLET, FILM COATED ORAL at 16:21

## 2022-12-20 RX ADMIN — HYDROMORPHONE HYDROCHLORIDE 0.4 MG: 0.2 INJECTION, SOLUTION INTRAMUSCULAR; INTRAVENOUS; SUBCUTANEOUS at 15:42

## 2022-12-20 RX ADMIN — OXYCODONE HYDROCHLORIDE 5 MG: 5 TABLET ORAL at 22:29

## 2022-12-20 RX ADMIN — MONTELUKAST 10 MG: 10 TABLET, FILM COATED ORAL at 21:02

## 2022-12-20 RX ADMIN — PROPOFOL 160 MG: 10 INJECTION, EMULSION INTRAVENOUS at 13:51

## 2022-12-20 RX ADMIN — DEXMEDETOMIDINE 12 MCG: 100 INJECTION, SOLUTION, CONCENTRATE INTRAVENOUS at 14:15

## 2022-12-20 RX ADMIN — HYDROMORPHONE HYDROCHLORIDE 1 MG: 1 INJECTION, SOLUTION INTRAMUSCULAR; INTRAVENOUS; SUBCUTANEOUS at 15:20

## 2022-12-20 RX ADMIN — HYDROMORPHONE HYDROCHLORIDE 0.2 MG: 0.2 INJECTION, SOLUTION INTRAMUSCULAR; INTRAVENOUS; SUBCUTANEOUS at 15:52

## 2022-12-20 RX ADMIN — PROPOFOL 150 MCG/KG/MIN: 10 INJECTION, EMULSION INTRAVENOUS at 13:51

## 2022-12-20 RX ADMIN — SODIUM CHLORIDE, POTASSIUM CHLORIDE, SODIUM LACTATE AND CALCIUM CHLORIDE: 600; 310; 30; 20 INJECTION, SOLUTION INTRAVENOUS at 21:40

## 2022-12-20 RX ADMIN — DEXAMETHASONE SODIUM PHOSPHATE 10 MG: 10 INJECTION, SOLUTION INTRAMUSCULAR; INTRAVENOUS at 13:51

## 2022-12-20 RX ADMIN — SENNOSIDES AND DOCUSATE SODIUM 1 TABLET: 50; 8.6 TABLET ORAL at 21:27

## 2022-12-20 RX ADMIN — ASPIRIN 81 MG: 81 TABLET ORAL at 21:33

## 2022-12-20 RX ADMIN — FENTANYL CITRATE 50 MCG: 50 INJECTION INTRAMUSCULAR; INTRAVENOUS at 15:37

## 2022-12-20 RX ADMIN — ROCURONIUM BROMIDE 50 MG: 10 INJECTION, SOLUTION INTRAVENOUS at 13:51

## 2022-12-20 RX ADMIN — HYDROMORPHONE HYDROCHLORIDE 0.5 MG: 1 INJECTION, SOLUTION INTRAMUSCULAR; INTRAVENOUS; SUBCUTANEOUS at 14:15

## 2022-12-20 RX ADMIN — BACLOFEN 10 MG: 10 TABLET ORAL at 21:02

## 2022-12-20 RX ADMIN — SODIUM CHLORIDE, POTASSIUM CHLORIDE, SODIUM LACTATE AND CALCIUM CHLORIDE: 600; 310; 30; 20 INJECTION, SOLUTION INTRAVENOUS at 12:35

## 2022-12-20 RX ADMIN — DEXMEDETOMIDINE HYDROCHLORIDE 0.5 MCG/KG/HR: 100 INJECTION, SOLUTION INTRAVENOUS at 14:15

## 2022-12-20 RX ADMIN — DEXMEDETOMIDINE 8 MCG: 100 INJECTION, SOLUTION, CONCENTRATE INTRAVENOUS at 14:18

## 2022-12-20 RX ADMIN — SERTRALINE HYDROCHLORIDE 100 MG: 50 TABLET, FILM COATED ORAL at 21:03

## 2022-12-20 RX ADMIN — SODIUM CHLORIDE, POTASSIUM CHLORIDE, SODIUM LACTATE AND CALCIUM CHLORIDE: 600; 310; 30; 20 INJECTION, SOLUTION INTRAVENOUS at 14:36

## 2022-12-20 RX ADMIN — MIDAZOLAM HYDROCHLORIDE 1 MG: 1 INJECTION, SOLUTION INTRAMUSCULAR; INTRAVENOUS at 12:46

## 2022-12-20 RX ADMIN — HYDRALAZINE HYDROCHLORIDE 10 MG: 20 INJECTION INTRAMUSCULAR; INTRAVENOUS at 14:24

## 2022-12-20 RX ADMIN — FENTANYL CITRATE 50 MCG: 50 INJECTION INTRAMUSCULAR; INTRAVENOUS at 12:46

## 2022-12-20 RX ADMIN — SUGAMMADEX 200 MG: 100 INJECTION, SOLUTION INTRAVENOUS at 15:09

## 2022-12-20 ASSESSMENT — ACTIVITIES OF DAILY LIVING (ADL)
ADLS_ACUITY_SCORE: 33
ADLS_ACUITY_SCORE: 31
ADLS_ACUITY_SCORE: 33

## 2022-12-20 NOTE — ANESTHESIA PROCEDURE NOTES
Airway       Patient location during procedure: OR       Procedure Start/Stop Times: 12/20/2022 1:53 PM  Staff -        CRNA: Efrain Mao APRN CRNA       Performed By: CRNA  Consent for Airway        Urgency: elective  Indications and Patient Condition       Indications for airway management: jr-procedural       Induction type:intravenous       Mask difficulty assessment: 1 - vent by mask    Final Airway Details       Final airway type: endotracheal airway       Successful airway: ETT - single  Endotracheal Airway Details        ETT size (mm): 7.0       Cuffed: yes       Successful intubation technique: video laryngoscopy       VL Blade Size: Glidescope 4       Grade View of Cords: 1       Adjucts: stylet       Position: Right       Measured from: lips       Secured at (cm): 22    Post intubation assessment        Placement verified by: capnometry and equal breath sounds        Number of attempts at approach: 1       Number of other approaches attempted: 0       Secured with: silk tape       Ease of procedure: easy       Dentition: Intact and Unchanged    Medication(s) Administered   Medication Administration Time: 12/20/2022 1:53 PM

## 2022-12-20 NOTE — INTERVAL H&P NOTE
"I have reviewed the surgical (or preoperative) H&P that is linked to this encounter, and examined the patient. There are no significant changes    Clinical Conditions Present on Arrival:  Clinically Significant Risk Factors Present on Admission                    # Severe Obesity: Estimated body mass index is 45.48 kg/m  as calculated from the following:    Height as of this encounter: 1.511 m (4' 11.5\").    Weight as of this encounter: 103.9 kg (229 lb).       "

## 2022-12-20 NOTE — PHARMACY-ADMISSION MEDICATION HISTORY
Pharmacy Note - Admission Medication History    Pertinent Provider Information: n/a   ______________________________________________________________________    Prior To Admission (PTA) med list completed and updated in EMR.       PTA Med List   Medication Sig Last Dose     acetaminophen (TYLENOL) 325 MG tablet Take 2 tablets (650 mg) by mouth every 4 hours as needed for other (mild pain)      acetaminophen (TYLENOL) 500 MG tablet Take 1,000 mg by mouth every 6 hours as needed for mild pain 12/19/2022     albuterol (PROAIR HFA/PROVENTIL HFA/VENTOLIN HFA) 108 (90 Base) MCG/ACT inhaler Inhale 2 puffs into the lungs every 4 hours as needed for shortness of breath / dyspnea or wheezing prn at has with     amLODIPine (NORVASC) 5 MG tablet Take 5 mg by mouth daily 12/19/2022     armodafinil (NUVIGIL) 50 MG TABS tablet Take 50 mg by mouth every morning 12/19/2022     aspirin 81 MG EC tablet Take 1 tablet (81 mg) by mouth 2 times daily      atorvastatin (LIPITOR) 10 MG tablet Take 10 mg by mouth daily 12/19/2022     baclofen (LIORESAL) 10 MG tablet Take 10 mg by mouth At Bedtime For MS 12/19/2022     baclofen (LIORESAL) 10 MG tablet Take 5-10 mg by mouth daily as needed (PRN during the daytime hours) prn     beclomethasone HFA (QVAR REDIHALER) 80 MCG/ACT inhaler Inhale 1 puff into the lungs 2 times daily 12/20/2022 at x1, has with     Calcium-Magnesium-Zinc 333-133-5 MG TABS per tablet Take 1 tablet by mouth daily 12/19/2022     carbidopa-levodopa (SINEMET CR)  MG CR tablet Take 1 tablet by mouth At Bedtime 12/19/2022     carbidopa-levodopa (SINEMET)  MG tablet Take 3 tablets by mouth 4 times daily 6 am, 10 am, 2 pm, 6 pm 12/20/2022 at x2     fluticasone (FLONASE) 50 MCG/ACT nasal spray Spray 1 spray into both nostrils daily 12/19/2022 at has with     glatiramer (COPAXONE) 20 MG/ML injection Inject 20 mg Subcutaneous every evening 12/18/2022 at has with     hydrochlorothiazide (HYDRODIURIL) 25 MG tablet Take 25  mg by mouth daily 12/19/2022     hydrOXYzine (VISTARIL) 25 MG capsule Take 25 mg by mouth 3 times daily as needed 12/20/2022 at x1     ibuprofen (ADVIL/MOTRIN) 200 MG tablet Take 400-600 mg by mouth every 4 hours as needed for pain 12/14/2022     losartan (COZAAR) 50 MG tablet Take 50 mg by mouth daily 12/19/2022     montelukast (SINGULAIR) 10 MG tablet Take 10 mg by mouth At Bedtime 12/19/2022     multivitamin, therapeutic (THERA-VIT) TABS tablet Take 1 tablet by mouth daily 12/13/2022     omeprazole (PRILOSEC) 20 MG DR capsule Take 20 mg by mouth daily 12/19/2022     oxyCODONE (ROXICODONE) 5 MG tablet Take 1-2 tablets (5-10 mg) by mouth every 4 hours as needed for moderate to severe pain      oxyCODONE (ROXICODONE) 5 MG tablet Take 5 mg by mouth every 8 hours as needed for severe pain (7-10) 12/20/2022 at 0600     pramipexole (MIRAPEX) 0.125 MG tablet Take 0.125 mg by mouth 3 times daily 6 am, 10 am, 10 pm 12/20/2022 at x2     senna-docusate (SENOKOT-S/PERICOLACE) 8.6-50 MG tablet Take 1-2 tablets by mouth 2 times daily Take while on oral narcotics to prevent or treat constipation.      sertraline (ZOLOFT) 100 MG tablet Take 100 mg by mouth daily 12/19/2022     Turmeric 500 MG TABS Take 1 tablet by mouth daily 12/12/2022     Vitamin D3 (CHOLECALCIFEROL) 125 MCG (5000 UT) tablet Take 1 tablet by mouth daily 12/13/2022     vitamin E (TOCOPHEROL) 400 units (180 mg) capsule Take 400 Units by mouth daily 12/13/2022     zolpidem (AMBIEN) 10 MG tablet Take 10 mg by mouth At Bedtime 12/19/2022       Information source(s): Patient, Caregiver and CareEverywhere/SureScripts    Method of interview communication: in-person    Patient was asked about OTC/herbal products specifically.  PTA med list reflects this.    Based on the pharmacist's assessment, the PTA med list information appears reliable    Allergies were reviewed, assessed, and updated with the patient.      Medications available for use during hospital stay:  inhalers, glatiramer. Patient has all medications with her..      Thank you for the opportunity to participate in the care of this patient.      Ngoc Sharma RPH     12/20/2022     12:44 PM

## 2022-12-20 NOTE — ANESTHESIA CARE TRANSFER NOTE
Patient: Kaylin Zaldivar    Procedure: Procedure(s):  LEFT TOTAL KNEE ARTHROPLASTY       Diagnosis: Osteoarthritis of left knee [M17.12]  Diagnosis Additional Information: No value filed.    Anesthesia Type:   General     Note:    Oropharynx: oropharynx clear of all foreign objects  Level of Consciousness: drowsy  Oxygen Supplementation: face mask  Level of Supplemental Oxygen (L/min / FiO2): 8  Independent Airway: airway patency satisfactory and stable  Dentition: dentition unchanged  Vital Signs Stable: post-procedure vital signs reviewed and stable  Report to RN Given: handoff report given  Patient transferred to: PACU    Handoff Report: Identifed the Patient, Identified the Reponsible Provider, Reviewed the pertinent medical history, Discussed the surgical course, Reviewed Intra-OP anesthesia mangement and issues during anesthesia, Set expectations for post-procedure period and Allowed opportunity for questions and acknowledgement of understanding      Vitals:  Vitals Value Taken Time   BP 94/52 12/20/22 1520   Temp 37.8  C (100.1  F) 12/20/22 1520   Pulse 89 12/20/22 1521   Resp 15 12/20/22 1521   SpO2 96 % 12/20/22 1521   Vitals shown include unvalidated device data.    Electronically Signed By: SHOSHANA Palafox CRNA  December 20, 2022  3:23 PM

## 2022-12-20 NOTE — ANESTHESIA POSTPROCEDURE EVALUATION
Patient: Kaylin Zaldivar    Procedure: Procedure(s):  LEFT TOTAL KNEE ARTHROPLASTY       Anesthesia Type:  General    Note:  Disposition: Admission   Postop Pain Control: Uneventful            Sign Out: Well controlled pain   PONV: No   Neuro/Psych: Uneventful            Sign Out: Acceptable/Baseline neuro status   Airway/Respiratory: Uneventful            Sign Out: Acceptable/Baseline resp. status   CV/Hemodynamics: Uneventful            Sign Out: Acceptable CV status; No obvious hypovolemia; No obvious fluid overload   Other NRE: NONE   DID A NON-ROUTINE EVENT OCCUR? No           Last vitals:  Vitals Value Taken Time   /60 12/20/22 1610   Temp 37.8  C (100.1  F) 12/20/22 1520   Pulse 106 12/20/22 1616   Resp 14 12/20/22 1616   SpO2 92 % 12/20/22 1616   Vitals shown include unvalidated device data.    Electronically Signed By: Ji Emmanuel MD  December 20, 2022  4:18 PM

## 2022-12-20 NOTE — OP NOTE
OPERATIVE NOTE    Name:  Kaylin RUFFIN Konradjamie  PCP:  Donte Dutta  Procedure Date:  12/20/2022      Pre-Procedure Diagnosis:  Osteoarthritis of left knee [M17.12]     Post-Procedure Diagnosis:    Same    Procedure:  Procedure(s):  LEFT TOTAL KNEE ARTHROPLASTY     Surgeon(s):  Sammy Arita MD      Assistant: noy avila pa-c    Circulator: Zamzam Cerna RN; Varsha Roe RN; Noy Lorenzana RN  Relief Circulator: Mena Drummond RN  Scrub Person: Kit Santos PAC was necessary to ensure safety of this patient and adequate progression of the procedure. This assist was needed for positioning, retracting of soft tissues, helping with placement of components, closure of the incision and application of the dressings.    Anesthesia Type:  general    Complications:    None    Condition on discharge from OR:  Good    Estimated Blood Loss:   30 ccs    Specimens:    ID Type Source Tests Collected by Time Destination   A : Bone fragments from left total knee arthroplasty disposed of in OR per policy Bone Fragments Knee, Left OR DOCUMENTATION ONLY Sammy Arita MD 12/20/2022  2:17 PM           Drains: none    Component: J/J Cr 3 femur, 3 tibia and 5mm poly and 32mm patella       Procedure and Findings:  After being informed of risks, benefits, alternatives to the procedure, patient desired to proceed, brought to the operating suite where they were placed under general anesthetic secondary to her history of MS and early parkinsons.  Patient received 2 grams of intravenous Ancef.  Physician Assistant: was present for the entire length of the case for the purposes of proper patient positioning, surgical exposure, and patient safety.  A time-out verification step was completed.       Attention was directed towards the patella.  A midline incision, 7 cm in length, just medial to the patella and up to the medial 1/3 of the quad and just medial to the tibial tuberical.  The patella was everted.  It was  measured at 32 mm.  It was resected, remeasured and a 22 mm asymmetric patella was positioned.Th patient  had grade 4 wear at the medial aspect of the trochlear groove and all along the medial femoral condyle, almost a cavitary defect noted. A large effusion was noted.   A protector plate was placed on the patella.  Attention was directed toward the distal femur.  IM guide irais was placed.  An 9 mm 5 degree distal femoral cut was completed.    Attention was directed towards the distal femur.  It was sized to a 3  component.  The 4-in-1 cutting block was placed along the epicondylar axis.  It was secured with 2 pins, anterior, posterior and chamfer cuts were completed.  Soft tissue balancing was then carried out.  Medial release was completed.The extramedullary tibial guide was placed, with the stylus at a 2 mm resection based on the medial tibial plateau.  The slope was made to match the tibial slope.  The oscillating saw was used to create this horizontile cut, preserving the PCL prior with an osteotome to protect. Lamina spreaders then were used to help balance and check the flx and extension gaps and any excess posterior medial meniscus and lateral meniscus were excised.  No aggressive posterior osteophyte was noted. I then sized the tibia to a size 3 and the rotation was set to be in line with the pcl, medial 1/3 of the patella and the first web space.  The IM drill and keel punch then set the rotation.   All components were trialed and the femoral lugs were drilled.  A size 3 CR femur was chosen.  The Patella was noted to track symmetrically throughout the range to motion.  The trial components were then removed, and the PCL and lateral sinan and medial sinan retractors were placed.   The cancellous surfaces were pulsatile lavaged. 1 batch of Simplex High Viscosity cement was mixed under vacuum.  The tibia, femur and patella were sequentially cemented in place.  The knee was held in extension with axial  compression until the cement had hardened.   The 5 mm insert was ultimately selected and secured  Care was taken to remove any excess cement.  Joint capsular injection with Solution 32 was performed.  Excellent range of motion and stability was demonstrated.    The joint was copiously irrigated.  Joint capsules were closed with a running 0 pds stratifix and  interrupted number 1 Ethibond figure-of-8 stitches.  Subcutaneous tissues were closed with2-0 Vicryl and skin was closed with running 3-0 monocryl stratifix and steristrips.  A sterile dressing was applied, aquacel type. Ace wrap and dressings placed.  Patient tolerated the procedure well without complication and was extubated returned to the [facility] in stable condition.    Sammy Arita MD     Date: 12/20/2022  Time: 3:07 PM

## 2022-12-20 NOTE — ANESTHESIA PREPROCEDURE EVALUATION
Anesthesia Pre-Procedure Evaluation    Patient: Kaylin Zaldivar   MRN: [id number] : 1955        Procedure : Procedure(s):  LEFT TOTAL KNEE ARTHROPLASTY          Past Medical History:   Diagnosis Date     Adjustment disorder with mixed anxiety and depressed mood      Arthritis      Dysphonia      Gastroesophageal reflux disease      Hyperlipidemia      Hypertension      Multiple sclerosis (H)      Obese      Parkinsons disease (H)      PONV (postoperative nausea and vomiting)      Prediabetes      SAH (subarachnoid hemorrhage) (H) 2019     Spasticity      Subdural hematoma 2019     TBI (traumatic brain injury) 2019     Uncomplicated asthma      Vertigo       Past Surgical History:   Procedure Laterality Date     BLADDER SURGERY  1978     BUNIONECTOMY Left     with bone spur removal      SECTION  1974     DILATION AND CURETTAGE  1981     HYSTERECTOMY, UDAY  1998     MAMMOPLASTY REDUCTION  1999     MENISCECTOMY Right 2007     REPAIR HAMMER TOE Right 2020     SALPINGECTOMY  1990     SALPINGO-OOPHORECTOMY BILATERAL  1998     TONSILLECTOMY  1979      Allergies   Allergen Reactions     Aleve [Naproxen]      Due to asthma, per MD recommendation     Erythromycin GI Disturbance     Eszopiclone Other (See Comments)     Hallucinations and metallic taste in mouth     Hydrocodone GI Disturbance     Melon Swelling     Swelling in throat from honeydew melon only     Penicillins GI Disturbance     Teriflunomide Other (See Comments) and Nausea and Vomiting     Abdominal and thoracic pain     Benzodiazepines Rash     Sulfamethoxazole Rash     Sulfa antibiotics     Trazodone Rash      Social History     Tobacco Use     Smoking status: Never     Smokeless tobacco: Never   Substance Use Topics     Alcohol use: Not Currently      Wt Readings from Last 1 Encounters:   22 103.9 kg (229 lb)        Anesthesia Evaluation   Pt has had prior anesthetic.         ROS/MED HX  ENT/Pulmonary:     (+) asthma     Neurologic:      (+) Parkinson's disease, Multiple Sclerosis,     Cardiovascular:     (+) hypertension-----    METS/Exercise Tolerance:     Hematologic:       Musculoskeletal:  - neg musculoskeletal ROS     GI/Hepatic:     (+) GERD,     Renal/Genitourinary:       Endo:     (+) Obesity,     Psychiatric/Substance Use:  - neg psychiatric ROS     Infectious Disease:  - neg infectious disease ROS     Malignancy:  - neg malignancy ROS     Other:  - neg other ROS          Physical Exam    Airway  airway exam normal           Respiratory Devices and Support         Dental  no notable dental history         Cardiovascular   cardiovascular exam normal          Pulmonary   pulmonary exam normal                OUTSIDE LABS:  CBC:   Lab Results   Component Value Date    WBC 10.4 12/20/2022    HGB 13.7 12/20/2022    HCT 42.7 12/20/2022     12/20/2022     BMP: No results found for: NA, POTASSIUM, CHLORIDE, CO2, BUN, CR, GLC  COAGS: No results found for: PTT, INR, FIBR  POC: No results found for: BGM, HCG, HCGS  HEPATIC: No results found for: ALBUMIN, PROTTOTAL, ALT, AST, GGT, ALKPHOS, BILITOTAL, BILIDIRECT, JAYSHREE  OTHER: No results found for: PH, LACT, A1C, ESTHER, PHOS, MAG, LIPASE, AMYLASE, TSH, T4, T3, CRP, SED    Anesthesia Plan    ASA Status:  3      Anesthesia Type: General.     - Airway: ETT              Consents    Anesthesia Plan(s) and associated risks, benefits, and realistic alternatives discussed. Questions answered and patient/representative(s) expressed understanding.    - Discussed:     - Discussed with:  Patient         Postoperative Care    Pain management: Peripheral nerve block (Single Shot), Multi-modal analgesia.   PONV prophylaxis: Ondansetron (or other 5HT-3), Dexamethasone or Solumedrol     Comments:                Ji Emmanuel MD

## 2022-12-20 NOTE — BRIEF OP NOTE
[facility] Brief Operative Note    Pre-operative diagnosis: Osteoarthritis of left knee [M17.12]   Post-operative diagnosis same   Procedure: Procedure(s):  LEFT TOTAL KNEE ARTHROPLASTY   Surgeon: Sammy Arita MD   Assistants(s): Noy avila pa-c   Estimated blood loss: Less than 30 ml    Specimens: None   Findings: DJD    Plan:  wbat  23 hr obs  Oxy  Asa 81mg bid x 6 wk  F/u tl care team 10-14 days

## 2022-12-20 NOTE — ANESTHESIA PROCEDURE NOTES
Adductor canal Procedure Note    Pre-Procedure   Staff -        Anesthesiologist:  Ji Emmanuel MD       Performed By: anesthesiologist       Location: pre-op       Procedure Start/Stop Times: 12/20/2022 12:42 PM and 12/20/2022 12:47 PM       Pre-Anesthestic Checklist: patient identified, IV checked, site marked, risks and benefits discussed, informed consent, monitors and equipment checked, pre-op evaluation, at physician/surgeon's request and post-op pain management  Timeout:       Correct Patient: Yes        Correct Procedure: Yes        Correct Site: Yes        Correct Position: Yes        Correct Laterality: Yes        Site Marked: Yes  Procedure Documentation  Procedure: Adductor canal       Laterality: left       Patient Position: supine       Patient Prep/Sterile Barriers: sterile gloves       Skin prep: Chloraprep       Needle Type: short bevel       Needle Gauge: 20.        Needle Length (Inches): 4        Ultrasound guided       1. Ultrasound was used to identify targeted nerve, plexus, vascular marker, or fascial plane and place a needle adjacent to it in real-time.       2. Ultrasound was used to visualize the spread of anesthetic in close proximity to the above referenced structure.       3. A permanent image is entered into the patient's record.       4. The visualized anatomic structures appeared normal.       5. There were no apparent abnormal pathologic findings.    Assessment/Narrative         The placement was positive for aspirated blood.       The placement was negative for: painful injection and site bleeding       Paresthesias: No.       Bolus given via needle..        Secured via.        Insertion/Infusion Method: Single Shot       Complications: none    Medication(s) Administered   Bupivacaine 0.5% w/ 1:400K Epi (Injection) - Injection   20 mL - 12/20/2022 12:42:00 PM  Medication Administration Time: 12/20/2022 12:42 PM      FOR [facility] ([facility]) ONLY:   Pain Team Contact information:  "please page the Pain Team Via [facility]. Search \"Pain\". During daytime hours, please page the attending first. At night please page the resident first.    "

## 2022-12-21 ENCOUNTER — APPOINTMENT (OUTPATIENT)
Dept: PHYSICAL THERAPY | Facility: CLINIC | Age: 67
End: 2022-12-21
Attending: ORTHOPAEDIC SURGERY
Payer: MEDICARE

## 2022-12-21 ENCOUNTER — APPOINTMENT (OUTPATIENT)
Dept: OCCUPATIONAL THERAPY | Facility: CLINIC | Age: 67
End: 2022-12-21
Attending: ORTHOPAEDIC SURGERY
Payer: MEDICARE

## 2022-12-21 LAB
GLUCOSE BLDC GLUCOMTR-MCNC: 128 MG/DL (ref 70–99)
GLUCOSE BLDC GLUCOMTR-MCNC: 148 MG/DL (ref 70–99)
GLUCOSE BLDC GLUCOMTR-MCNC: 159 MG/DL (ref 70–99)
GLUCOSE BLDC GLUCOMTR-MCNC: 161 MG/DL (ref 70–99)
HGB BLD-MCNC: 11.3 G/DL (ref 11.7–15.7)

## 2022-12-21 PROCEDURE — 250N000013 HC RX MED GY IP 250 OP 250 PS 637: Performed by: ORTHOPAEDIC SURGERY

## 2022-12-21 PROCEDURE — 97166 OT EVAL MOD COMPLEX 45 MIN: CPT | Mod: GO

## 2022-12-21 PROCEDURE — 97116 GAIT TRAINING THERAPY: CPT | Mod: GP

## 2022-12-21 PROCEDURE — 82962 GLUCOSE BLOOD TEST: CPT

## 2022-12-21 PROCEDURE — 97110 THERAPEUTIC EXERCISES: CPT | Mod: GP

## 2022-12-21 PROCEDURE — 99204 OFFICE O/P NEW MOD 45 MIN: CPT | Performed by: INTERNAL MEDICINE

## 2022-12-21 PROCEDURE — 36415 COLL VENOUS BLD VENIPUNCTURE: CPT | Performed by: ORTHOPAEDIC SURGERY

## 2022-12-21 PROCEDURE — 85018 HEMOGLOBIN: CPT | Performed by: ORTHOPAEDIC SURGERY

## 2022-12-21 PROCEDURE — 97535 SELF CARE MNGMENT TRAINING: CPT | Mod: GO

## 2022-12-21 PROCEDURE — 250N000013 HC RX MED GY IP 250 OP 250 PS 637: Performed by: INTERNAL MEDICINE

## 2022-12-21 PROCEDURE — 250N000011 HC RX IP 250 OP 636: Performed by: ORTHOPAEDIC SURGERY

## 2022-12-21 PROCEDURE — 97162 PT EVAL MOD COMPLEX 30 MIN: CPT | Mod: GP

## 2022-12-21 RX ORDER — ZOLPIDEM TARTRATE 5 MG/1
10 TABLET ORAL
Status: DISCONTINUED | OUTPATIENT
Start: 2022-12-21 | End: 2022-12-22 | Stop reason: HOSPADM

## 2022-12-21 RX ORDER — NICOTINE POLACRILEX 4 MG
15-30 LOZENGE BUCCAL
Status: DISCONTINUED | OUTPATIENT
Start: 2022-12-21 | End: 2022-12-22 | Stop reason: HOSPADM

## 2022-12-21 RX ORDER — DEXTROSE MONOHYDRATE 25 G/50ML
25-50 INJECTION, SOLUTION INTRAVENOUS
Status: DISCONTINUED | OUTPATIENT
Start: 2022-12-21 | End: 2022-12-22 | Stop reason: HOSPADM

## 2022-12-21 RX ADMIN — PRAMIPEXOLE DIHYDROCHLORIDE 0.12 MG: 0.12 TABLET ORAL at 10:00

## 2022-12-21 RX ADMIN — OXYCODONE HYDROCHLORIDE 5 MG: 5 TABLET ORAL at 16:39

## 2022-12-21 RX ADMIN — SERTRALINE HYDROCHLORIDE 100 MG: 50 TABLET, FILM COATED ORAL at 21:50

## 2022-12-21 RX ADMIN — MONTELUKAST 10 MG: 10 TABLET, FILM COATED ORAL at 21:50

## 2022-12-21 RX ADMIN — OXYCODONE HYDROCHLORIDE 5 MG: 5 TABLET ORAL at 02:36

## 2022-12-21 RX ADMIN — BACLOFEN 10 MG: 10 TABLET ORAL at 21:51

## 2022-12-21 RX ADMIN — OXYCODONE HYDROCHLORIDE 5 MG: 5 TABLET ORAL at 10:53

## 2022-12-21 RX ADMIN — CARBIDOPA AND LEVODOPA 3 TABLET: 25; 100 TABLET ORAL at 06:20

## 2022-12-21 RX ADMIN — CARBIDOPA AND LEVODOPA 3 TABLET: 25; 100 TABLET ORAL at 14:03

## 2022-12-21 RX ADMIN — ACETAMINOPHEN 975 MG: 325 TABLET ORAL at 01:41

## 2022-12-21 RX ADMIN — OXYCODONE HYDROCHLORIDE 5 MG: 5 TABLET ORAL at 22:05

## 2022-12-21 RX ADMIN — LOSARTAN POTASSIUM 50 MG: 50 TABLET, FILM COATED ORAL at 08:27

## 2022-12-21 RX ADMIN — CARBIDOPA AND LEVODOPA 1 TABLET: 25; 100 TABLET, EXTENDED RELEASE ORAL at 21:50

## 2022-12-21 RX ADMIN — Medication 1 SPRAY: at 08:32

## 2022-12-21 RX ADMIN — SENNOSIDES AND DOCUSATE SODIUM 1 TABLET: 50; 8.6 TABLET ORAL at 08:29

## 2022-12-21 RX ADMIN — ASPIRIN 81 MG: 81 TABLET ORAL at 08:27

## 2022-12-21 RX ADMIN — HYDROXYZINE HYDROCHLORIDE 25 MG: 25 TABLET, FILM COATED ORAL at 16:39

## 2022-12-21 RX ADMIN — CARBIDOPA AND LEVODOPA 3 TABLET: 25; 100 TABLET ORAL at 18:03

## 2022-12-21 RX ADMIN — ACETAMINOPHEN 975 MG: 325 TABLET ORAL at 16:10

## 2022-12-21 RX ADMIN — AMLODIPINE BESYLATE 5 MG: 5 TABLET ORAL at 08:28

## 2022-12-21 RX ADMIN — ZOLPIDEM TARTRATE 10 MG: 5 TABLET ORAL at 23:52

## 2022-12-21 RX ADMIN — GLATIRAMER ACETATE 20 MG: 20 INJECTION, SOLUTION SUBCUTANEOUS at 22:04

## 2022-12-21 RX ADMIN — SENNOSIDES AND DOCUSATE SODIUM 1 TABLET: 50; 8.6 TABLET ORAL at 21:51

## 2022-12-21 RX ADMIN — HYDROMORPHONE HYDROCHLORIDE 0.2 MG: 0.2 INJECTION, SOLUTION INTRAMUSCULAR; INTRAVENOUS; SUBCUTANEOUS at 23:52

## 2022-12-21 RX ADMIN — CALCIUM 500 MG: 500 TABLET ORAL at 08:29

## 2022-12-21 RX ADMIN — ASPIRIN 81 MG: 81 TABLET ORAL at 21:50

## 2022-12-21 RX ADMIN — ACETAMINOPHEN 975 MG: 325 TABLET ORAL at 08:29

## 2022-12-21 RX ADMIN — HYDROCHLOROTHIAZIDE 25 MG: 25 TABLET ORAL at 08:31

## 2022-12-21 RX ADMIN — CARBIDOPA AND LEVODOPA 3 TABLET: 25; 100 TABLET ORAL at 10:00

## 2022-12-21 RX ADMIN — PRAMIPEXOLE DIHYDROCHLORIDE 0.12 MG: 0.12 TABLET ORAL at 21:50

## 2022-12-21 RX ADMIN — CEFAZOLIN SODIUM 2 G: 2 INJECTION, SOLUTION INTRAVENOUS at 06:26

## 2022-12-21 RX ADMIN — OXYCODONE HYDROCHLORIDE 10 MG: 5 TABLET ORAL at 06:41

## 2022-12-21 RX ADMIN — PRAMIPEXOLE DIHYDROCHLORIDE 0.12 MG: 0.12 TABLET ORAL at 06:19

## 2022-12-21 ASSESSMENT — ACTIVITIES OF DAILY LIVING (ADL)
ADLS_ACUITY_SCORE: 32
ADLS_ACUITY_SCORE: 32
ADLS_ACUITY_SCORE: 36
ADLS_ACUITY_SCORE: 33
IADL_COMMENTS: COMPLETED BY FAMILY
ADLS_ACUITY_SCORE: 36
ADLS_ACUITY_SCORE: 32
ADLS_ACUITY_SCORE: 36
ADLS_ACUITY_SCORE: 32
ADLS_ACUITY_SCORE: 33
ADLS_ACUITY_SCORE: 36

## 2022-12-21 NOTE — PROGRESS NOTES
12/21/22 5395   Appointment Info   Signing Clinician's Name / Credentials (PT) Chuckie Mckeon   Living Environment   People in Home child(judith), adult   Current Living Arrangements house   Home Accessibility stairs to enter home   Number of Stairs, Main Entrance 1   Stair Railings, Main Entrance none   Number of Stairs, Within Home, Primary none   Transportation Anticipated family or friend will provide   Living Environment Comments one step in and can stay on one level   Self-Care   Usual Activity Tolerance fair   Current Activity Tolerance moderate   Equipment Currently Used at Home walker, rolling;commode chair   Fall history within last six months no   Activity/Exercise/Self-Care Comment I with basic ADL's , but not ambulating very much in home and has been using commode, daughter help her with spouse.  Patient states she is walking much better than before surgery   General Information   Onset of Illness/Injury or Date of Surgery 12/20/22   Pertinent History of Current Problem (include personal factors and/or comorbidities that impact the POC) L TKA, MS, parkinsons, TBI   Weight-Bearing Status - LLE weight-bearing as tolerated   Cognition   Affect/Mental Status (Cognition) anxious;unable/difficult to assess   Follows Commands (Cognition) WFL   Posture    Posture Not impaired   Range of Motion (ROM)   Range of Motion ROM deficits secondary to surgical procedure   Strength (Manual Muscle Testing)   Strength (Manual Muscle Testing) No deficits observed during functional mobility   Transfers   Transfers sit-stand transfer   Sit-Stand Transfer   Sit-Stand [place] (Transfers) modified independence   Assistive Device (Sit-Stand Transfers) walker, front-wheeled   Gait/Stairs (Locomotion)   [place] Level (Gait) modified independence   Assistive Device (Gait) walker, front-wheeled   Distance in Feet 20   Distance in Feet (Gait) 15,15, 40, 10, 100   Pattern (Gait) swing-through   Deviations/Abnormal Patterns  (Gait) gait speed decreased   Balance   Balance no deficits were identified   Sensory Examination   Sensory Perception patient reports no sensory changes   Clinical Impression   Criteria for Skilled Therapeutic Intervention Yes, treatment indicated   PT Diagnosis (PT) impaired functional mobility   Influenced by the following impairments weakness, pain   Functional limitations due to impairments transfers, gait   Clinical Presentation (PT Evaluation Complexity) Stable/Uncomplicated   Clinical Presentation Rationale Pt. presents as medically diagnosed   Clinical Decision Making (Complexity) moderate complexity   Planned Therapy Interventions (PT) gait training;home exercise program;patient/family education;stair training;transfer training   Anticipated Equipment Needs at Discharge (PT) walker, rolling   Risk & Benefits of therapy have been explained evaluation/treatment results reviewed;risks/benefits reviewed;participants included;patient   PT Total Evaluation Time   PT Eval, Moderate Complexity Minutes (46852) 15   Physical Therapy Goals   PT Frequency One time eval and treatment only   PT Predicted Duration/Target Date for Goal Attainment 12/21/22   PT Goals Transfers;Gait;Stairs;PT Goal 1   PT: Transfers Modified independent;Sit to/from stand;Assistive device;Within precautions;Goal Met   PT: Gait Modified independent;Rolling walker;Within precautions;50 feet;Goal Met   PT: Stairs 4 stairs;Minimal assist;Rail on both sides;Goal Met   PT: Goal 1 I with HEP , goal met   Interventions   Interventions Quick Adds Gait Training;Therapeutic Procedure;Therapeutic Activity   Therapeutic Procedure/Exercise   Ther. Procedure: strength, endurance, ROM, flexibillity Minutes (51078) 15   Symptoms Noted During/After Treatment none   Treatment Detail/Skilled Intervention TKA HEP x 10,  extra time needed for cueing and instruction   Gait Training   Gait Training Minutes (67105) 23   Symptoms Noted During/After Treatment (Gait  Training) none   Treatment Detail/Skilled Intervention vc for reciprocal gait, heel strike, gait is stable, vc for non recip steps , tolerating all  activitiy well   [place] Level (Gait Training) independent   Physical Assistance Level (Gait Training) verbal cues   Weight Bearing (Gait Training) weight-bearing as tolerated   Assistive Device (Gait Training) rolling walker   Pattern Analysis (Gait Training) swing-through gait   Gait Analysis Deviations decreased falguni;decreased step length   Impairments (Gait Analysis/Training) pain;strength decreased   Stair Railings present at both sides   Physical Assist/Nonphysical Assist (Stairs) supervision;1 person assist;verbal cues   Level of [place] (Stairs) contact guard   PT Discharge Planning   PT Plan DC PT   PT Discharge Recommendation (DC Rec) home with outpatient physical therapy   PT Rationale for DC Rec Paitent mobilizing well overall and has good home setup and support.   PT Brief overview of current status Patient indepnendent with functional mobility.   Total Session Time   Timed Code Treatment Minutes 38   Total Session Time (sum of timed and untimed services) 53

## 2022-12-21 NOTE — PROGRESS NOTES
12/21/22 0830   Appointment Info   Signing Clinician's Name / Credentials (OT) Jasper Cruz OTR/L   Quick Adds   Quick Adds Certification   Living Environment   People in Home child(judith), adult   Current Living Arrangements apartment  (triplex)   Living Environment Comments Tub/shower with GB and shower chair.  RTS with GB.   Self-Care   Usual Activity Tolerance fair   Current Activity Tolerance moderate   Equipment Currently Used at Home shower chair;grab bar, toilet;grab bar, tub/shower   Instrumental Activities of Daily Living (IADL)   IADL Comments Completed by family   General Information   Onset of Illness/Injury or Date of Surgery 12/20/22   Referring Physician Dr. Sammy Arita   Patient/Family Therapy Goal Statement (OT) To return home with help from family   Additional Occupational Profile Info/Pertinent History of Current Problem Adm for TKA.  PMH:  GERD, Asthma, Pre-DM, obesity, Adjustment Disorder, Skin Lesions , TBI, MS, Parkinson's Hyperlip.   Left Lower Extremity (Weight-bearing Status) weight-bearing as tolerated (WBAT)   Cognitive Status Examination   Orientation Status orientation to person, place and time   Follows Commands WNL   Visual Perception   Visual Impairment/Limitations corrective lenses full-time   Bed Mobility   Bed Mobility supine-sit;sit-supine   Supine-Sit [place] (Bed Mobility) supervision;verbal cues;minimum assist (75% patient effort)   Sit-Supine [place] (Bed Mobility) supervision;verbal cues;minimum assist (75% patient effort)   Transfers   Transfers bed-chair transfer;sit-stand transfer;toilet transfer   Transfer Skill: Bed to Chair/Chair to Bed   Bed-Chair [place] (Transfers) supervision;verbal cues   Assistive Device (Bed-Chair Transfers) rolling walker   Sit-Stand Transfer   Sit-Stand [place] (Transfers) supervision;verbal cues   Assistive Device (Sit-Stand Transfers) walker, front-wheeled   Toilet Transfer   Type (Toilet Transfer)  sit-stand;stand-sit   [place] Level (Toilet Transfer) supervision;verbal cues   Assistive Device (Toilet Transfer) walker, front-wheeled   Balance   Balance Assessment standing balance: dynamic   Balance Comments good   Activities of Daily Living   BADL Assessment/Intervention upper body dressing;lower body dressing;grooming;toileting   Upper Body Dressing Assessment/Training   Position (Upper Body Dressing) supported sitting   [place] Level (Upper Body Dressing) independent   Lower Body Dressing Assessment/Training   Position (Lower Body Dressing) supported sitting;supported standing   Assistive Devices (Lower Body Dressing) reacher   [place] Level (Lower Body Dressing) modified independence   Grooming Assessment/Training   Position (Grooming) supported standing   [place] Level (Grooming) supervision;verbal cues   Toileting   Position (Toileting) supported sitting;supported standing   Assistive Devices (Toileting) raised toilet seat;grab bar, toilet   [place] Level (Toileting) supervision;verbal cues   Clinical Impression   Criteria for Skilled Therapeutic Interventions Met (OT) Yes, treatment indicated   OT Diagnosis decreased indep with ADLs due to TKA   OT Problem List-Impairments impacting ADL problems related to;mobility   Assessment of Occupational Performance 3-5 Performance Deficits   Identified Performance Deficits dressing, toileting, bathing, trsfs   Planned Therapy Interventions (OT) ADL retraining   Clinical Decision Making Complexity (OT) moderate complexity   Risk & Benefits of therapy have been explained evaluation/treatment results reviewed;participants included;patient   OT Total Evaluation Time   OT Eval, Moderate Complexity Minutes (94975) 15   Therapy Certification   Medical Diagnosis TKA   Start of Care Date 12/21/22   Certification date from 12/21/22   Certification date to 01/21/23   OT Goals   Therapy Frequency (OT) Daily   OT Predicted Duration/Target Date for Goal  Attainment 12/21/22   OT Goals Lower Body Dressing;Transfers   OT: Lower Body Dressing Modified independent;Completed   OT: Transfer Modified independent;Completed   Interventions   Interventions Quick Adds Self-Care/Home Management   Self-Care/Home Management   Self-Care/Home Mgmt/ADL, Compensatory, Meal Prep Minutes (51603) 30   Symptoms Noted During/After Treatment (Meal Preparation/Planning Training) none   Treatment Detail/Skilled Intervention Pt in chair when therapist arrived.  RN present and adding O2 at 2L with sats 95%.  Worked on room trsfs. using her FWW.  Pt needed SBA initially for correct hand placement.  Completed trsfs to chair, bed, toilet, BR sink.  Therapist demo car and reviewed tub/shower with shower chair.  Pt understood technique.  With bed trsf Pt needed min assist of one to lift her leg up onto the bed.  Will have help from family at home.  At end of session, Pt in chair with chair alarm on and call light in hand.   [place] Level (Grooming Training) independent   Assistance (Grooming Training) supervision;verbal cues  (for hand placement on vanity.)   [place] Level (Upper Body Dressing Training) independent   Lower Body Dressing Training Assistance independent   Lower Body Dressing Training Assistance supervision;verbal cues  (to use AE and for sequencing.)   [place] Level (Toilet Training) independent   Assistance (Toilet Training) supervision;verbal cues  (for hand placement.)   Toilet Training Assistance - Assistive Device commode overlay   OT Discharge Planning   OT Plan D/C OT   OT Discharge Recommendation (DC Rec) (S)  home with assist   OT Rationale for DC Rec Family to assist at home   OT Brief overview of current status Pt met her OT goals.   Total Session Time   Timed Code Treatment Minutes 30   Total Session Time (sum of timed and untimed services) 45    [facility]  OUTPATIENT OCCUPATIONAL THERAPY  EVALUATION  PLAN OF TREATMENT FOR  OUTPATIENT REHABILITATION  (COMPLETE FOR INITIAL CLAIMS ONLY)  Patient's Last Name, First Name, M.I.  YOB: 1955  Kaylin Zaldivar                          Provider's Name  [facility] Medical Record No.  [id number]                             Onset Date:  12/20/22   Start of Care Date:  12/21/22   Type:     ___PT   _X_OT   ___SLP Medical Diagnosis:  TKA                    OT Diagnosis:  decreased indep with ADLs due to TKA Visits from SOC:  1     See note for plan of treatment, functional goals and certification details    I CERTIFY THE NEED FOR THESE SERVICES FURNISHED UNDER        THIS PLAN OF TREATMENT AND WHILE UNDER MY CARE     (Physician co-signature of this document indicates review and certification of the therapy plan).

## 2022-12-21 NOTE — PROGRESS NOTES
Physical Therapy Discharge Summary    Reason for therapy discharge:    Discharged to home with outpatient therapy.    Progress towards therapy goal(s). See goals on Care Plan in [facility] electronic health record for goal details.  Goals met    Therapy recommendation(s):    Continued therapy is recommended.  Rationale/Recommendations:  Paitent not at functional mobility baseline.

## 2022-12-21 NOTE — PLAN OF CARE
roblem: Knee Arthroplasty  Goal: Acceptable Pain Control  Intervention: Prevent or Manage Pain  Recent Flowsheet Documentation  Taken 12/21/2022 0236 by Marcia Luz, RN  Pain Management Interventions: medication (see MAR)  Taken 12/21/2022 0141 by Marcia Luz, RN  Pain Management Interventions: medication (see MAR)     Problem: Knee Arthroplasty  Goal: Effective Urinary Elimination  Outcome: Progressing    A&Ox4. VSS, on 2 liters O2 via NC overnight sating in low- mid 90's. C/o pain in left knee and leg. Got a bit nauseous with pain too. PRN 5-10 mg Oxycodone given; effective. Up SBA to BSC. Voiding well on shift. Therapies this AM. PIV SL with intermittent antibiotics. Dressing and ace wrap in place to left knee/leg. Is WBAT to left leg. Discharge pending surgical recovery.

## 2022-12-21 NOTE — PROGRESS NOTES
Occupational Therapy Discharge Summary    Reason for therapy discharge:    All goals and outcomes met, no further needs identified.    Progress towards therapy goal(s). See goals on Care Plan in [facility] electronic health record for goal details.  Goals met    Therapy recommendation(s):    No further therapy is recommended.

## 2022-12-21 NOTE — PLAN OF CARE
Pt A&Ox4, able to make needs known, came up at around 2030. Pt reporting pain 9/10, PRN oxycodone given with improvement. BP elevated. No other acute changes noted this shift.    Arelis Rhoades RN

## 2022-12-21 NOTE — PROGRESS NOTES
"[facility] Orthopaedics Progress Note      Post-operative Day: 1 Day Post-Op    Procedure(s):  LEFT TOTAL KNEE ARTHROPLASTY        Plan: Anticoagulation protocol: Aspirin 81 mg BID  x 42  days            Pain medications:  Oxycodone, tylenol, dilaudid            Weight bearing status:  WBAT            Disposition:  Home pending medicine consult for new hypoxia on 2 liters O2             Continue cares and rehabilitation     Subjective:    Patient is doing well from an orthopedic standpoint. She has been ambulatory with therapies this morning without difficultly. She reports minimal pain, which is well controlled with oxycodone and tylenol today. She does have new onset hypoxia with sats in the high 80s and currently on 2 liters O2. She report history of asthma but no history of home O2 requirements. Medicine consult was placed.     Pain: minimal  Chest pain: No  SOB: New onset hypoxia, on 2 Liters nasal cannula  Denies lightheadedness/dizziness  Denies nausea/ vomiting  Passing flatus  voiding well    Objective:  Blood pressure (!) 145/88, pulse 103, temperature 98.4  F (36.9  C), temperature source Oral, resp. rate 20, height 1.473 m (4' 10\"), weight 96.1 kg (211 lb 14.4 oz), SpO2 94 %.    Patient Vitals for the past 24 hrs:   BP Temp Temp src Pulse Resp SpO2 Height Weight   12/21/22 0828 (!) 145/88 -- -- -- -- -- -- --   12/21/22 0519 (!) 157/71 98.4  F (36.9  C) Oral 103 20 94 % -- --   12/21/22 0200 (!) 143/66 98.8  F (37.1  C) Oral 110 18 95 % -- --   12/21/22 0131 -- -- -- -- -- 93 % -- --   12/21/22 0059 -- -- -- -- -- -- -- 96.1 kg (211 lb 14.4 oz)   12/20/22 2300 (!) 152/66 98.5  F (36.9  C) Oral 106 18 95 % -- --   12/20/22 2200 (!) 153/71 98.9  F (37.2  C) Oral 99 18 95 % -- --   12/20/22 2121 (!) 148/69 98.9  F (37.2  C) Oral 100 18 93 % -- --   12/20/22 2053 (!) 158/71 97.7  F (36.5  C) Oral 107 18 94 % -- --   12/20/22 2020 137/64 99  F (37.2  C) Oral 108 18 92 % 1.473 m (4' 10\") 102.7 kg (226 lb 8 " "oz)   12/20/22 1930 128/77 -- -- 114 -- 95 % -- --   12/20/22 1900 123/56 -- -- 110 -- 94 % -- --   12/20/22 1800 -- -- -- 108 -- 91 % -- --   12/20/22 1750 -- -- -- 109 -- 92 % -- --   12/20/22 1740 -- -- -- 110 -- 94 % -- --   12/20/22 1730 -- -- -- 109 -- 91 % -- --   12/20/22 1720 -- -- -- 118 -- 92 % -- --   12/20/22 1710 130/70 -- -- 112 -- 92 % -- --   12/20/22 1700 119/59 -- -- 111 19 95 % -- --   12/20/22 1620 127/61 -- -- 104 16 92 % -- --   12/20/22 1610 129/60 -- -- 107 15 90 % -- --   12/20/22 1600 138/66 -- -- 108 18 96 % -- --   12/20/22 1550 (!) 140/68 -- -- 104 14 96 % -- --   12/20/22 1540 135/70 -- -- 104 13 96 % -- --   12/20/22 1520 -- 100.1  F (37.8  C) Temporal -- -- -- -- --   12/20/22 1315 (!) 143/70 -- -- 97 23 96 % -- --   12/20/22 1300 (!) 141/67 -- -- 95 26 97 % -- --   12/20/22 1255 136/62 -- -- 92 22 98 % -- --   12/20/22 1250 (!) 150/69 -- -- 91 23 100 % -- --   12/20/22 1240 (!) 161/78 -- -- 92 23 97 % -- --   12/20/22 1156 (!) 178/80 98.5  F (36.9  C) -- 92 15 96 % 1.473 m (4' 10\") 103.9 kg (229 lb)       Wt Readings from Last 4 Encounters:   12/21/22 96.1 kg (211 lb 14.4 oz)         Motor function, sensation, and circulation intact   Yes  Wound status: Aquacel is clean, with minimal shadowing, dry, and intact. Yes  Calf tenderness: Bilateral  No    Pertinent Labs   Lab Results: personally reviewed.   Acute Blood Loss Anemia: non symptomatic, no further treatment or monitoring necessary at this time.     Recent Labs   Lab Test 12/21/22  0543 12/20/22  1216   HGB 11.3* 13.7   HCT  --  42.7   MCV  --  86   PLT  --  263       Report completed by:  Tiana Joseph PA-C  Date: 12/21/2022       "

## 2022-12-21 NOTE — CONSULTS
[facility] Medicine Service Consult Note.     Physician requesting consult: Sammy Arita MD  Thank you, Sammy Zhang MD, for asking the [facility] Medicine Service to see Kaylin Grantfernando in consultation.    Assessment/Recommendations   Assessment/Plan: 67-year-old female status post left total knee arthroplasty, hospital medicine service consulted for postoperative hypoxia.    Mild persistent asthma  Morbid obesity Body mass index is 44.29 kg/m .  Postoperative hypoxia likely anesthesia effect, obesity hypoventilation.  Outpatient consultation for sleep apnea evaluation.  Could consider [facility] comprehensive weight management program at discharge  Resume PTA medications Singulair, beclomethasone HFA 1 puff twice daily, albuterol.  Schedule albuterol 4 times daily.  Continue pulmonary toileting and incentive spirometry.  Continue continuous pulse oximetry.    Hypertension, essential  PTA medication: Amlodipine 5 mg daily, hydrochlorothiazide 25 mg daily, losartan 50 mg daily.  order creatinine and potassium level for AM  Hold parameters written, as patient is high risk of post operative hypotension.      Prediabetes with hyperglycemia  Steroid induced hyperglycemia.  Hemoglobin A1c 6.2 on 8/3/2022.  Goal blood glucose less than 180 mg/dL  Order insulin NovoLog medium correction 4 times daily AC at bedtime    History of traumatic brain injury history of subarachnoid hemorrhage subdural hematoma 2019  Multiple sclerosis  PTA medication: Continue glatiramer home dose.     Parkinson's disease  PTA medication: Resume Sinemet Mirapex and baclofen.    Hyperlipidemia  PTA medication: Resume atorvastatin.    Adjustment disorder  Mixed anxiety depressed mood.  PTA medication resume sertraline.    Gastroesophageal reflux disease  PTA medication omeprazole.    Code status:Full Code  -Reviewed the patient's preoperative H and P and updated missing elements.  -Home medication  "reconciliation has been reviewed.      History of Present Illness/Subjective    HPI: Ms. Kaylin Zaldivar is a 67 year old female status post Procedure(s):  LEFT TOTAL KNEE ARTHROPLASTY  Post-operative Day: 1 Day Post-Op, hospital medicine was consulted for postoperative hypoxia.    Patient's asthma is well managed and she has no symptoms including shortness of breath, wheezing or cough.  She has not been hospitalized for her asthma in the past year.  She denies any recent changes to inhaled medication.  Denies rescue inhaler use.  Usually does not use her rescue inhaler and often times device expires without her using it at all.    HTN. The patient had this problem for >1 years.  Perioperative blood pressures have ranged between 143-157 mmHg.   Associated symptoms as none. They don't occur after anything.   Not taking blood pressure medicine makes it worse. Taking blood pressure medicine makes it better.     Patient denies cerebrovascular accident, myocardial infarction, pulmonary embolism, or deep venous thrombosis.     Estimated Blood Loss:  * No values recorded between 12/20/2022  2:08 PM and 12/20/2022  3:15 PM *    I have reviewed preop physical including past medical hx, family hx, social hx, surgical hx, medication hx.      Physical Examination  Review of Systems   VITALS: BP (!) 145/88 (BP Location: Left arm, Patient Position: Sitting, Cuff Size: Adult Regular)   Pulse 103   Temp 98  F (36.7  C) (Oral)   Resp 18   Ht 1.473 m (4' 10\")   Wt 96.1 kg (211 lb 14.4 oz)   SpO2 95%   BMI 44.29 kg/m    BMI: Body mass index is 44.29 kg/m .  Wt Readings from Last 3 Encounters:   12/21/22 96.1 kg (211 lb 14.4 oz)       Intake/Output Summary (Last 24 hours) at 12/21/2022 1127  Last data filed at 12/21/2022 1000  Gross per 24 hour   Intake 1930 ml   Output 950 ml   Net 980 ml     General Appearance:   no acute distress.   ENT/Mouth: membranes moist, no oral lesions or bleeding gums.      EYES:  no scleral icterus, " normal conjunctivae   Neck: no carotid bruits or thyromegaly   Chest/Lungs:   lungs are clear to auscultation, no rales or wheezing, equal chest wall expansion    Cardiovascular:   Regular. Normal S1/S2 heart sounds with no murmurs, rubs, or gallops.   Abdomen:  no organomegaly, masses, bruits, or tenderness; bowel sounds are present   Extremities: no cyanosis or clubbing   Skin: no xanthelasma, warm.    Neurologic: normal  bilateral, no tremors     Psychiatric: alert and oriented x3, calm     A 12 point comprehensive review of systems was negative except as noted above.         Medical History  Surgical History Family History Social History   There are no problems to display for this patient.   Past Surgical History:   Procedure Laterality Date     ARTHROPLASTY KNEE Left 2022    Procedure: LEFT TOTAL KNEE ARTHROPLASTY;  Surgeon: Sammy Arita MD;  Location: [facility] Main OR     BLADDER SURGERY  1978     BUNIONECTOMY Left     with bone spur removal      SECTION  1974     DILATION AND CURETTAGE  1981     HYSTERECTOMY, UDAY  1998     MAMMOPLASTY REDUCTION  1999     MENISCECTOMY Right 2007     REPAIR HAMMER TOE Right 2020     SALPINGECTOMY  1990     SALPINGO-OOPHORECTOMY BILATERAL  1998     TONSILLECTOMY  1979     Reviewed, and family history is not on file.     Reviewed, and she  reports that she has never smoked. She has never used smokeless tobacco. She reports that she does not currently use alcohol. She reports that she does not use drugs.  Social History     Tobacco Use     Smoking status: Never     Smokeless tobacco: Never   Substance Use Topics     Alcohol use: Not Currently        Medications  Allergies   Medications Prior to Admission   Medication Sig Dispense Refill Last Dose     acetaminophen (TYLENOL) 500 MG tablet Take 1,000 mg by mouth every 6 hours as needed for mild pain   2022     albuterol (PROAIR HFA/PROVENTIL HFA/VENTOLIN HFA) 108 (90 Base) MCG/ACT inhaler  Inhale 2 puffs into the lungs every 4 hours as needed for shortness of breath / dyspnea or wheezing   prn at has with     amLODIPine (NORVASC) 5 MG tablet Take 5 mg by mouth daily   12/19/2022     armodafinil (NUVIGIL) 50 MG TABS tablet Take 50 mg by mouth every morning   12/19/2022     atorvastatin (LIPITOR) 10 MG tablet Take 10 mg by mouth daily   12/19/2022     baclofen (LIORESAL) 10 MG tablet Take 10 mg by mouth At Bedtime For MS   12/19/2022     baclofen (LIORESAL) 10 MG tablet Take 5-10 mg by mouth daily as needed (PRN during the daytime hours)   prn     beclomethasone HFA (QVAR REDIHALER) 80 MCG/ACT inhaler Inhale 1 puff into the lungs 2 times daily   12/20/2022 at x1, has with     Calcium-Magnesium-Zinc 333-133-5 MG TABS per tablet Take 1 tablet by mouth daily   12/19/2022     carbidopa-levodopa (SINEMET CR)  MG CR tablet Take 1 tablet by mouth At Bedtime   12/19/2022     carbidopa-levodopa (SINEMET)  MG tablet Take 3 tablets by mouth 4 times daily 6 am, 10 am, 2 pm, 6 pm   12/20/2022 at x2     fluticasone (FLONASE) 50 MCG/ACT nasal spray Spray 1 spray into both nostrils daily   12/19/2022 at has with     glatiramer (COPAXONE) 20 MG/ML injection Inject 20 mg Subcutaneous every evening   12/18/2022 at has with     hydrochlorothiazide (HYDRODIURIL) 25 MG tablet Take 25 mg by mouth daily   12/19/2022     hydrOXYzine (VISTARIL) 25 MG capsule Take 25 mg by mouth 3 times daily as needed   12/20/2022 at x1     ibuprofen (ADVIL/MOTRIN) 200 MG tablet Take 400-600 mg by mouth every 4 hours as needed for pain   12/14/2022     losartan (COZAAR) 50 MG tablet Take 50 mg by mouth daily   12/19/2022     montelukast (SINGULAIR) 10 MG tablet Take 10 mg by mouth At Bedtime   12/19/2022     multivitamin, therapeutic (THERA-VIT) TABS tablet Take 1 tablet by mouth daily   12/13/2022     omeprazole (PRILOSEC) 20 MG DR capsule Take 20 mg by mouth daily   12/19/2022     oxyCODONE (ROXICODONE) 5 MG tablet Take 5 mg by  mouth every 8 hours as needed for severe pain (7-10)   12/20/2022 at 0600     pramipexole (MIRAPEX) 0.125 MG tablet Take 0.125 mg by mouth 3 times daily 6 am, 10 am, 10 pm   12/20/2022 at x2     sertraline (ZOLOFT) 100 MG tablet Take 100 mg by mouth daily   12/19/2022     Turmeric 500 MG TABS Take 1 tablet by mouth daily   12/12/2022     Vitamin D3 (CHOLECALCIFEROL) 125 MCG (5000 UT) tablet Take 1 tablet by mouth daily   12/13/2022     vitamin E (TOCOPHEROL) 400 units (180 mg) capsule Take 400 Units by mouth daily   12/13/2022     zolpidem (AMBIEN) 10 MG tablet Take 10 mg by mouth At Bedtime   12/19/2022       Allergies   Allergen Reactions     Aleve [Naproxen]      Due to asthma, per MD recommendation     Erythromycin GI Disturbance     Eszopiclone Other (See Comments)     Hallucinations and metallic taste in mouth     Hydrocodone GI Disturbance     Melon Swelling     Swelling in throat from honeydew melon only     Penicillins GI Disturbance     Teriflunomide Other (See Comments) and Nausea and Vomiting     Abdominal and thoracic pain     Benzodiazepines Rash     Sulfamethoxazole Rash     Sulfa antibiotics     Trazodone Rash         Cardiographics Reviewed Personally By Myself   EKG Results: personally reviewed.  Sinus rhythm.  Imaging Reviewed Personally By Myself    Radiology Results: No results found for this or any previous visit (from the past 24 hour(s)).    Labs Reviewed Personally By Myself     Results for orders placed or performed during the hospital encounter of 12/20/22 (from the past 24 hour(s))   Hemoglobin A1c   Result Value Ref Range    Hemoglobin A1C 6.2 (H) <5.7 %   CBC with platelets   Result Value Ref Range    WBC Count 10.4 4.0 - 11.0 10e3/uL    RBC Count 4.99 3.80 - 5.20 10e6/uL    Hemoglobin 13.7 11.7 - 15.7 g/dL    Hematocrit 42.7 35.0 - 47.0 %    MCV 86 78 - 100 fL    MCH 27.5 26.5 - 33.0 pg    MCHC 32.1 31.5 - 36.5 g/dL    RDW 13.2 10.0 - 15.0 %    Platelet Count 263 150 - 450 10e3/uL    Potassium   Result Value Ref Range    Potassium 3.3 (L) 3.5 - 5.0 mmol/L   Creatinine   Result Value Ref Range    Creatinine 0.63 0.60 - 1.10 mg/dL    GFR Estimate >90 >60 mL/min/1.73m2   Glucose by meter   Result Value Ref Range    GLUCOSE BY METER POCT 155 (H) 70 - 99 mg/dL   Hemoglobin   Result Value Ref Range    Hemoglobin 11.3 (L) 11.7 - 15.7 g/dL   Glucose by meter   Result Value Ref Range    GLUCOSE BY METER POCT 148 (H) 70 - 99 mg/dL       Thank you for this consultation.  Appreciate the opportunity to participate in the care of Kaylin Zaldivar, please feel free to contact us for any questions or concerns.    Gene Ravi DO, MS  [facility]  Internal Medicine  Phone: #[phone]

## 2022-12-22 ENCOUNTER — DOCUMENTATION ONLY (OUTPATIENT)
Dept: HOME HEALTH SERVICES | Facility: CLINIC | Age: 67
End: 2022-12-22

## 2022-12-22 ENCOUNTER — APPOINTMENT (OUTPATIENT)
Dept: RADIOLOGY | Facility: CLINIC | Age: 67
End: 2022-12-22
Attending: INTERNAL MEDICINE
Payer: MEDICARE

## 2022-12-22 VITALS
WEIGHT: 224.9 LBS | TEMPERATURE: 98.9 F | OXYGEN SATURATION: 93 % | DIASTOLIC BLOOD PRESSURE: 94 MMHG | HEART RATE: 99 BPM | HEIGHT: 58 IN | SYSTOLIC BLOOD PRESSURE: 137 MMHG | RESPIRATION RATE: 18 BRPM | BODY MASS INDEX: 47.21 KG/M2

## 2022-12-22 LAB
GLUCOSE BLDC GLUCOMTR-MCNC: 126 MG/DL (ref 70–99)
GLUCOSE BLDC GLUCOMTR-MCNC: 135 MG/DL (ref 70–99)
GLUCOSE BLDC GLUCOMTR-MCNC: 152 MG/DL (ref 70–99)
HGB BLD-MCNC: 11.4 G/DL (ref 11.7–15.7)
POTASSIUM BLD-SCNC: 3.1 MMOL/L (ref 3.5–5)

## 2022-12-22 PROCEDURE — 84132 ASSAY OF SERUM POTASSIUM: CPT | Performed by: INTERNAL MEDICINE

## 2022-12-22 PROCEDURE — 250N000012 HC RX MED GY IP 250 OP 636 PS 637: Performed by: INTERNAL MEDICINE

## 2022-12-22 PROCEDURE — 36415 COLL VENOUS BLD VENIPUNCTURE: CPT | Performed by: ORTHOPAEDIC SURGERY

## 2022-12-22 PROCEDURE — 96372 THER/PROPH/DIAG INJ SC/IM: CPT | Performed by: INTERNAL MEDICINE

## 2022-12-22 PROCEDURE — 250N000013 HC RX MED GY IP 250 OP 250 PS 637: Performed by: INTERNAL MEDICINE

## 2022-12-22 PROCEDURE — 250N000013 HC RX MED GY IP 250 OP 250 PS 637: Performed by: ORTHOPAEDIC SURGERY

## 2022-12-22 PROCEDURE — 85018 HEMOGLOBIN: CPT | Performed by: ORTHOPAEDIC SURGERY

## 2022-12-22 PROCEDURE — 82962 GLUCOSE BLOOD TEST: CPT

## 2022-12-22 PROCEDURE — 36415 COLL VENOUS BLD VENIPUNCTURE: CPT | Performed by: INTERNAL MEDICINE

## 2022-12-22 PROCEDURE — 99225 PR SUBSEQUENT OBSERVATION CARE,LEVEL II: CPT | Performed by: INTERNAL MEDICINE

## 2022-12-22 PROCEDURE — 71045 X-RAY EXAM CHEST 1 VIEW: CPT

## 2022-12-22 RX ORDER — POTASSIUM CHLORIDE 1500 MG/1
40 TABLET, EXTENDED RELEASE ORAL ONCE
Status: COMPLETED | OUTPATIENT
Start: 2022-12-22 | End: 2022-12-22

## 2022-12-22 RX ORDER — POTASSIUM CHLORIDE 1500 MG/1
20 TABLET, EXTENDED RELEASE ORAL ONCE
Status: DISCONTINUED | OUTPATIENT
Start: 2022-12-22 | End: 2022-12-22

## 2022-12-22 RX ORDER — HYDROXYZINE HYDROCHLORIDE 25 MG/1
25 TABLET, FILM COATED ORAL 3 TIMES DAILY PRN
Qty: 30 TABLET | Refills: 1 | Status: SHIPPED | OUTPATIENT
Start: 2022-12-22

## 2022-12-22 RX ADMIN — LOSARTAN POTASSIUM 50 MG: 50 TABLET, FILM COATED ORAL at 08:41

## 2022-12-22 RX ADMIN — PRAMIPEXOLE DIHYDROCHLORIDE 0.12 MG: 0.12 TABLET ORAL at 05:21

## 2022-12-22 RX ADMIN — OXYCODONE HYDROCHLORIDE 5 MG: 5 TABLET ORAL at 09:44

## 2022-12-22 RX ADMIN — ACETAMINOPHEN 975 MG: 325 TABLET ORAL at 16:06

## 2022-12-22 RX ADMIN — CARBIDOPA AND LEVODOPA 3 TABLET: 25; 100 TABLET ORAL at 09:44

## 2022-12-22 RX ADMIN — ACETAMINOPHEN 975 MG: 325 TABLET ORAL at 00:03

## 2022-12-22 RX ADMIN — HYDROCHLOROTHIAZIDE 25 MG: 25 TABLET ORAL at 10:44

## 2022-12-22 RX ADMIN — POTASSIUM CHLORIDE 40 MEQ: 1500 TABLET, EXTENDED RELEASE ORAL at 13:14

## 2022-12-22 RX ADMIN — Medication 1 SPRAY: at 08:41

## 2022-12-22 RX ADMIN — CALCIUM 500 MG: 500 TABLET ORAL at 08:40

## 2022-12-22 RX ADMIN — CARBIDOPA AND LEVODOPA 3 TABLET: 25; 100 TABLET ORAL at 05:21

## 2022-12-22 RX ADMIN — PRAMIPEXOLE DIHYDROCHLORIDE 0.12 MG: 0.12 TABLET ORAL at 09:43

## 2022-12-22 RX ADMIN — CARBIDOPA AND LEVODOPA 3 TABLET: 25; 100 TABLET ORAL at 13:43

## 2022-12-22 RX ADMIN — AMLODIPINE BESYLATE 5 MG: 5 TABLET ORAL at 08:40

## 2022-12-22 RX ADMIN — OXYCODONE HYDROCHLORIDE 5 MG: 5 TABLET ORAL at 05:21

## 2022-12-22 RX ADMIN — ACETAMINOPHEN 975 MG: 325 TABLET ORAL at 08:40

## 2022-12-22 RX ADMIN — INSULIN ASPART 1 UNITS: 100 INJECTION, SOLUTION INTRAVENOUS; SUBCUTANEOUS at 08:47

## 2022-12-22 RX ADMIN — SENNOSIDES AND DOCUSATE SODIUM 1 TABLET: 50; 8.6 TABLET ORAL at 08:40

## 2022-12-22 RX ADMIN — ASPIRIN 81 MG: 81 TABLET ORAL at 08:40

## 2022-12-22 ASSESSMENT — ACTIVITIES OF DAILY LIVING (ADL)
ADLS_ACUITY_SCORE: 30

## 2022-12-22 NOTE — PLAN OF CARE
Problem: Plan of Care - These are the overarching goals to be used throughout the patient stay.    Goal: Plan of Care Review  Description: The Plan of Care Review/Shift note should be completed every shift.  The Outcome Evaluation is a brief statement about your assessment that the patient is improving, declining, or no change.  This information will be displayed automatically on your shift note.  Outcome: Progressing   Goal Outcome Evaluation:    Patient is alert. Able to make needs known. Patient requiring supplemental oxygen throughout the shift. Pulse ox changed multiple times. Patient states she does not feel short of breath or have any dyspnea with exertion. Requiring 1-2L via NC. Patient worked with therapy, completed and passed. Pain controlled with scheduled and PRN PO pain medication.     Oxana Crane RN  12/21/2022

## 2022-12-22 NOTE — PLAN OF CARE
Goal Outcome Evaluation:    Pt A&O x 4. Able to make needs known. Vitally stable, continued attempting to wean O2. Doing well on room air when awake, desatting to low 80s when off O2 while sleeping so placed on 1-2 L via NC overnight. Encouraging use of incentive spirometer when awake. Given PRN oxycodone x 2, dilaudid 0.2 mg x 1 for episode of breakthrough pain. PT reports pain more well-controlled this morning. Up with assist of 1 with walker/gait belt, ambulating well.        Overall Patient Progress: improving     Problem: Knee Arthroplasty  Goal: Absence of Bleeding  Outcome: Progressing    Problem: Knee Arthroplasty  Goal: Acceptable Pain Control  Outcome: Progressing

## 2022-12-22 NOTE — PROGRESS NOTES
Care Management Initial Consult    General Information  Assessment completed with:  Chart assessment     Communication Assessment  Patient's communication style: spoken language (English or Bilingual)    Hearing Difficulty or Deaf: no        Cognitive  Cognitive/Neuro/Behavioral: WDL                      Living Environment:   People in home: spouse, alone     Current living Arrangements:        Able to return to prior arrangements:       Current Resources:   Patient receiving home care services:       Community Resources:    Equipment currently used at home: shower chair, grab bar, toilet, grab bar, tub/shower  Supplies currently used at home:      Lifestyle & Psychosocial Needs:  Social Determinants of Health     Tobacco Use: Low Risk      Smoking Tobacco Use: Never     Smokeless Tobacco Use: Never     Passive Exposure: Not on file   Alcohol Use: Not on file   Financial Resource Strain: Not on file   Food Insecurity: Not on file   Transportation Needs: Not on file   Physical Activity: Not on file   Stress: Not on file   Social Connections: Not on file   Intimate Partner Violence: Not on file   Depression: Not on file   Housing Stability: Not on file       Additional Information:  10:03 AM  Pt will discharge home with assist from family and OP PT at [facility] in [place], [place].  No CPM.  Family will transport home.    GARETT Helton

## 2022-12-22 NOTE — PLAN OF CARE
Pt requires 2L NC with activity and at rest. Home O2 study completed, [facility] oxygen services contacted. Pain rated as 3/10. A & O x4. Up stand by assist, voiding, no BM yet, but passing gas. Vitally stable. Discharge education completed with patient and her friend Nolvia, all questions answered.     Aranza Childress RN on 12/22/2022 at 3:14 PM

## 2022-12-22 NOTE — PLAN OF CARE
Patient vital signs are at baseline: No,  Reason:  Requiring 2L NC    Patient able to ambulate as they were prior to admission or with assist devices provided by therapies during their stay:  Yes  Patient MUST void prior to discharge:  Yes  Patient able to tolerate oral intake:  Yes  Pain has adequate pain control using Oral analgesics:  Yes  Does patient have an identified :  Yes  Has goal D/C date and time been discussed with patient:  Yes Hoping to discharge today, pt qualifies for home O2, awaiting home O2 supplies.     Aranza Childress RN on 12/22/2022 at 11:18 AM

## 2022-12-22 NOTE — PROGRESS NOTES
Oxygen Provider:  Arranged through [facility] due to her living outside of our service area, contact number [phone]. If you have any questions for concerns please call the oxygen company directly.

## 2022-12-22 NOTE — DISCHARGE SUMMARY
[facility] Orthopedics Discharge Summary                                  [facility]     MARGARITA FELDMAN [id number]   Age: 67 year old  PCP: Donte Dutta, [phone] 1955     Date of Admission:  12/20/2022  Date of Discharge::  12/22/2022  Discharge Provider:  Makenna R. Pechumer, APRN CNP    Code status:  Full Code    Admission Information:  Admission Diagnosis:  Osteoarthritis of left knee [M17.12]  Left knee DJD [M17.12]    Post-Operative Day: 2 Days Post-Op     Reason for admission:  The patient was admitted for the following:Procedure(s) (LRB):  LEFT TOTAL KNEE ARTHROPLASTY (Left)    Active Problems:    * No active hospital problems. *      Allergies:  Aleve [naproxen], Erythromycin, Eszopiclone, Hydrocodone, Melon, Penicillins, Teriflunomide, Benzodiazepines, Sulfamethoxazole, and Trazodone    Following the procedure noted above the patient was transferred to the post-op floor and started on:    Therapy:  physical therapy and occupational therapy  Anticoagulation Plan: Aspirin 81 mg BID  for 42 days  Pain Management: scopainmedication: oxycodone, tylenol and vistaril  Weight bearing status: Weight bearing as tolerated     The patient was followed by Orthopedics during the inpatient treatment course:  Complications:  None  Additional consultations:  Hospital medicine     Pertinent Labs   Lab Results: personally reviewed.     Recent Labs   Lab Test 12/22/22  0533 12/21/22  0543 12/20/22  1216   HGB 11.4* 11.3* 13.7   HCT  --   --  42.7   MCV  --   --  86   PLT  --   --  263          Discharge Information:  Condition at discharge: Stable  Discharge destination:  Discharged to home     Medications at discharge:  Current Discharge Medication List      START taking these medications    Details   aspirin 81 MG EC tablet Take 1 tablet (81 mg) by mouth 2 times daily  Qty: 60 tablet, Refills: 0    Associated Diagnoses: Primary osteoarthritis of left knee      hydrOXYzine (ATARAX) 25 MG tablet Take 1  tablet (25 mg) by mouth 3 times daily as needed for other (muscle spasm)  Qty: 30 tablet, Refills: 1    Associated Diagnoses: Primary osteoarthritis of left knee      senna-docusate (SENOKOT-S/PERICOLACE) 8.6-50 MG tablet Take 1-2 tablets by mouth 2 times daily Take while on oral narcotics to prevent or treat constipation.  Qty: 30 tablet, Refills: 0    Comments: While taking narcotics  Associated Diagnoses: Primary osteoarthritis of left knee         CONTINUE these medications which have CHANGED    Details   acetaminophen (TYLENOL) 325 MG tablet Take 2 tablets (650 mg) by mouth every 4 hours as needed for other (mild pain)  Qty: 100 tablet, Refills: 0    Associated Diagnoses: Primary osteoarthritis of left knee      oxyCODONE (ROXICODONE) 5 MG tablet Take 1-2 tablets (5-10 mg) by mouth every 4 hours as needed for moderate to severe pain  Qty: 33 tablet, Refills: 0    Associated Diagnoses: Primary osteoarthritis of left knee         CONTINUE these medications which have NOT CHANGED    Details   albuterol (PROAIR HFA/PROVENTIL HFA/VENTOLIN HFA) 108 (90 Base) MCG/ACT inhaler Inhale 2 puffs into the lungs every 4 hours as needed for shortness of breath / dyspnea or wheezing      amLODIPine (NORVASC) 5 MG tablet Take 5 mg by mouth daily      armodafinil (NUVIGIL) 50 MG TABS tablet Take 50 mg by mouth every morning      atorvastatin (LIPITOR) 10 MG tablet Take 10 mg by mouth daily      !! baclofen (LIORESAL) 10 MG tablet Take 10 mg by mouth At Bedtime For MS      !! baclofen (LIORESAL) 10 MG tablet Take 5-10 mg by mouth daily as needed (PRN during the daytime hours)      beclomethasone HFA (QVAR REDIHALER) 80 MCG/ACT inhaler Inhale 1 puff into the lungs 2 times daily      Calcium-Magnesium-Zinc 333-133-5 MG TABS per tablet Take 1 tablet by mouth daily      carbidopa-levodopa (SINEMET CR)  MG CR tablet Take 1 tablet by mouth At Bedtime      carbidopa-levodopa (SINEMET)  MG tablet Take 3 tablets by mouth 4  times daily 6 am, 10 am, 2 pm, 6 pm      fluticasone (FLONASE) 50 MCG/ACT nasal spray Spray 1 spray into both nostrils daily      glatiramer (COPAXONE) 20 MG/ML injection Inject 20 mg Subcutaneous every evening      hydrochlorothiazide (HYDRODIURIL) 25 MG tablet Take 25 mg by mouth daily      ibuprofen (ADVIL/MOTRIN) 200 MG tablet Take 400-600 mg by mouth every 4 hours as needed for pain      losartan (COZAAR) 50 MG tablet Take 50 mg by mouth daily      montelukast (SINGULAIR) 10 MG tablet Take 10 mg by mouth At Bedtime      multivitamin, therapeutic (THERA-VIT) TABS tablet Take 1 tablet by mouth daily      omeprazole (PRILOSEC) 20 MG DR capsule Take 20 mg by mouth daily      pramipexole (MIRAPEX) 0.125 MG tablet Take 0.125 mg by mouth 3 times daily 6 am, 10 am, 10 pm      sertraline (ZOLOFT) 100 MG tablet Take 100 mg by mouth daily      Turmeric 500 MG TABS Take 1 tablet by mouth daily      Vitamin D3 (CHOLECALCIFEROL) 125 MCG (5000 UT) tablet Take 1 tablet by mouth daily      vitamin E (TOCOPHEROL) 400 units (180 mg) capsule Take 400 Units by mouth daily      zolpidem (AMBIEN) 10 MG tablet Take 10 mg by mouth At Bedtime       !! - Potential duplicate medications found. Please discuss with provider.      STOP taking these medications       hydrOXYzine (VISTARIL) 25 MG capsule Comments:   Reason for Stopping:                          Follow-Up Care:  Patient should be seen in the office in 14 days by the Orthopedic Surgeon/Physician Assistant.  Call [phone] for appointment or questions.    It was my pleasure to take care of the above patient.  Makenna R. Pechumer, SHOSHANA CNP

## 2022-12-22 NOTE — PROGRESS NOTES
Oxygen Documentation:   I certify that this patient, Kaylin Zaldivar has been under my care. This is the face-to-face encounter for oxygen medical necessity.      Kaylin Zaldivar is now in a chronic stable state and continues to require supplemental oxygen. Patient has continued oxygen desaturation due to Chronic Respiratory Failure with Hypoxia J96.11.    Alternative treatment(s) tried or considered and deemed clinically infective for treatment of Chronic Respiratory Failure with Hypoxia J96.11 include pulmonary toileting.  If portability is ordered, is the patient mobile within the home? yes    Gene Ravi DO, MS  [facility] Service  Internal Medicine

## 2022-12-22 NOTE — PROVIDER NOTIFICATION
Patient has been assessed for Home Oxygen needs.     Pulse oximetry (SpO2) and Oxygen flow readings:    SpO2 = 86% on room air at rest while awake.    SpO2 improved to 92% on 2 liters/minute at rest.    SpO2 = 76% on room air during activity/with exercise.    *SpO2 improved to 94% on 2 liters/minute during activity/with exercise.      Hospitalist and orthopaedic team notified.     Aranza Childress RN on 12/22/2022 at 11:45 AM

## 2022-12-22 NOTE — PROGRESS NOTES
Returned patients home medications to patient and friend. 6.5 tablets of oxycodone returned to patient at time of discharge. Count clarified with MATT Hall.     Aranza Childress RN on 12/22/2022 at 3:31 PM

## 2022-12-22 NOTE — PROGRESS NOTES
[facility]    Medicine Progress Note - Hospitalist Service    Date of Admission:  12/20/2022    Assessment & Plan   67-year-old female status post left total knee arthroplasty, hospital medicine service consulted for postoperative hypoxia.     Mild persistent asthma  Morbid obesity Body mass index is 47 kg/m .  Postoperative hypoxia likely anesthesia effect, obesity hypoventilation.  Outpatient consultation for sleep apnea evaluation.  Could consider [facility] comprehensive weight management program at discharge  Continue PTA medications Singulair, beclomethasone HFA 1 puff twice daily, albuterol.  Schedule albuterol 4 times daily.    Continue pulmonary toileting and incentive spirometry.  Continue continuous pulse oximetry.  home oxygen assessment per primary team  Order chest Xray.  Outpatient sleep evaluation per PCP.      Postoperative anemia secondary to acute blood loss.  Hemoglobin 13.7->11.4 g/dL.  No transfusion indicated.   Defer further workup to primary service.     Hypertension, essential  Hypokalemia  PTA medication: Amlodipine 5 mg daily, hydrochlorothiazide 25 mg daily, losartan 50 mg daily.  creatinine and potassium level reviewed.  Order KCl 20 mEQ once.      Prediabetes with hyperglycemia  Steroid induced hyperglycemia.  Hemoglobin A1c 6.2 on 8/3/2022.  Goal blood glucose less than 180 mg/dL  insulin NovoLog medium correction 4 times daily AC at bedtime     History of traumatic brain injury history of subarachnoid hemorrhage subdural hematoma 2019  Multiple sclerosis  PTA medication: Continue glatiramer home dose.   Parkinson's disease  PTA medication: Resume Sinemet Mirapex and baclofen.  Hyperlipidemia  PTA medication: Resume atorvastatin.  Adjustment disorder  Mixed anxiety depressed mood.  PTA medication resume sertraline.  Gastroesophageal reflux disease  PTA medication omeprazole.       Diet: Advance Diet as Tolerated: Regular Diet Adult  Discharge  "Instruction - Regular Diet Adult  Discharge Instruction - Regular Diet Adult    DVT Prophylaxis: Defer to primary service  Hol[place] Catheter: Not present  Central Lines: None  Cardiac Monitoring: None  Code Status: Full Code      Disposition Plan      Expected Discharge Date: 12/22/2022,  9:00 AM      Discharge Comments: New supplemental O2 requirements 12/21.        The patient's care was discussed with the Bedside Nurse, Patient and Primary team.    Gene Ravi DO  Hospitalist Service  [facility]    Clinically Significant Risk Factors Present on Admission        # Hypokalemia: Lowest K = 3.3 mmol/L in last 2 days, will replace as needed           # Hypertension: home medication list includes antihypertensive(s)   # Acute Respiratory Failure: Documented O2 saturation < 91%.  Continue supplemental oxygen as needed     # Severe Obesity: Estimated body mass index is 47 kg/m  as calculated from the following:    Height as of this encounter: 1.473 m (4' 10\").    Weight as of this encounter: 102 kg (224 lb 14.4 oz).       ______________________________________________________________________    Interval History   No new complaints.  86% on room air this morning per nursing report.  She is currently on room air at 92-95% during our interview.     Data reviewed today: I reviewed all medications, new labs and imaging results over the last 24 hours. I personally reviewed.    Physical Exam   Vital Signs: Temp: 98.9  F (37.2  C) Temp src: Oral BP: 127/80 Pulse: 99   Resp: 18 SpO2: 93 % O2 Device: Nasal cannula Oxygen Delivery: 2 LPM  Weight: 224 lbs 14.4 oz  General Appearance:   no acute distress.   ENT/Mouth: membranes moist, no oral lesions or bleeding gums.      EYES:  no scleral icterus, normal conjunctivae   Neck: no carotid bruits or thyromegaly   Chest/Lungs:   lungs are clear to auscultation, no rales or wheezing, equal chest wall expansion    Cardiovascular:   Regular. Normal S1/S2 heart " sounds with no murmurs, rubs, or gallops.   Abdomen:  no organomegaly, masses, bruits, or tenderness; bowel sounds are present   Extremities: no cyanosis or clubbing   Skin: no xanthelasma, warm.    Neurologic: normal  bilateral, no tremors      Psychiatric: alert and oriented x3, calm        Data   Recent Labs   Lab 12/22/22  0846 12/22/22  0533 12/22/22  0525 12/21/22  2158 12/21/22  0627 12/21/22  0543 12/20/22  1548 12/20/22  1216   WBC  --   --   --   --   --   --   --  10.4   HGB  --  11.4*  --   --   --  11.3*  --  13.7   MCV  --   --   --   --   --   --   --  86   PLT  --   --   --   --   --   --   --  263   POTASSIUM  --   --   --   --   --   --   --  3.3*   CR  --   --   --   --   --   --   --  0.63   *  --  126* 161*   < >  --    < >  --     < > = values in this interval not displayed.     No results found for this or any previous visit (from the past 24 hour(s)).  Medications     lactated ringers 100 mL/hr at 12/21/22 0131       acetaminophen  975 mg Oral Q8H     amLODIPine  5 mg Oral Daily     armodafinil  50 mg Oral QAM     aspirin  81 mg Oral BID     baclofen  10 mg Oral At Bedtime     beclomethasone HFA  1 puff Inhalation BID     calcium carbonate 500 mg (elemental)  500 mg Oral Daily     carbidopa-levodopa  1 tablet Oral At Bedtime     carbidopa-levodopa  3 tablet Oral 4x Daily     fluticasone  1 spray Both Nostrils Daily     glatiramer  20 mg Subcutaneous QPM     hydrochlorothiazide  25 mg Oral Daily     insulin aspart  1-7 Units Subcutaneous TID AC     insulin aspart  1-5 Units Subcutaneous At Bedtime     losartan  50 mg Oral Daily     montelukast  10 mg Oral At Bedtime     polyethylene glycol  17 g Oral Daily     pramipexole  0.125 mg Oral TID     senna-docusate  1 tablet Oral BID     sertraline  100 mg Oral Daily     sodium chloride (PF)  3 mL Intracatheter Q8H

## 2022-12-22 NOTE — PROGRESS NOTES
"[facility] Orthopaedics Progress Note      Post-operative Day: 2 Days Post-Op    Procedure(s):  LEFT TOTAL KNEE ARTHROPLASTY      Subjective:    Pain: moderate  Chest pain, SOB:  No      Objective:  Blood pressure 127/80, pulse 99, temperature 98.9  F (37.2  C), temperature source Oral, resp. rate 18, height 1.473 m (4' 10\"), weight 102 kg (224 lb 14.4 oz), SpO2 93 %.    Patient Vitals for the past 24 hrs:   BP Temp Temp src Pulse Resp SpO2 Weight   12/22/22 0700 127/80 98.9  F (37.2  C) Oral 99 18 93 % --   12/22/22 0515 -- -- -- -- -- -- 102 kg (224 lb 14.4 oz)   12/22/22 0013 119/58 99.1  F (37.3  C) Oral 96 18 92 % --   12/21/22 2205 -- -- -- -- -- 93 % --   12/21/22 1954 139/67 98.6  F (37  C) Oral 98 18 95 % --   12/21/22 1900 -- -- -- -- -- 96 % --   12/21/22 1850 -- -- -- -- -- 94 % --   12/21/22 1815 -- -- -- -- -- 95 % --   12/21/22 1724 -- -- -- -- -- 93 % --   12/21/22 1610 -- -- -- -- -- 94 % --   12/21/22 1536 (!) 149/69 98.6  F (37  C) Oral 105 -- 96 % --   12/21/22 1404 -- -- -- -- -- (!) 87 % --   12/21/22 1322 -- -- -- -- -- 95 % --   12/21/22 1315 -- -- -- -- -- (!) 86 % --   12/21/22 1300 -- -- -- -- -- 93 % --   12/21/22 1200 102/56 98.5  F (36.9  C) Oral 101 18 92 % --       Wt Readings from Last 4 Encounters:   12/22/22 102 kg (224 lb 14.4 oz)         Motor function, sensation, and circulation intact   Yes  Wound status: incisions are clean dry and intact. Yes  Calf tenderness: Bilateral  No    Pertinent Labs   Lab Results: personally reviewed.     Recent Labs   Lab Test 12/22/22  0533 12/21/22  0543 12/20/22  1216   HGB 11.4* 11.3* 13.7   HCT  --   --  42.7   MCV  --   --  86   PLT  --   --  263       Plan: Anticoagulation protocol: Aspirin 81 mg BID  x 42  days            Pain medications:  scopainmedication: oxycodone, tylenol and vistaril            Weight bearing status:  WBAT            Disposition:  Home today pending need for supplemental O2, home O2 study ordered             Continue " cares and rehabilitation     Report completed by:  Makenna R. Pechumer, APRN CNP  Date: 12/22/2022  Time: 8:42 AM

## 2022-12-27 ENCOUNTER — DOCUMENTATION ONLY (OUTPATIENT)
Dept: OTHER | Facility: CLINIC | Age: 67
End: 2022-12-27

## (undated) DEVICE — GLOVE UNDER INDICATOR PI SZ 6.5 LF 41665

## (undated) DEVICE — DRSG STERI STRIP 1/2X4" R1547

## (undated) DEVICE — GLOVE SURG PI ULTRA TOUCH M SZ 6-1/2 LF

## (undated) DEVICE — A3 SUPPLIES- SEE NURSING INFO PAGE

## (undated) DEVICE — BLADE SAW SAGITTAL STRK WIDE 25.4X85X1.2MM 2108-151-000

## (undated) DEVICE — PLATE GROUNDING ADULT W/CORD 9165L

## (undated) DEVICE — CUSTOM PACK TOTAL KNEE ACCESSORY SOP5BTAHEA

## (undated) DEVICE — SOL NACL 0.9% IRRIG 1000ML BOTTLE 2F7124

## (undated) DEVICE — SU ETHIBOND 1 CT-1 30" X425H

## (undated) DEVICE — BANDAGE ELASTIC 6X550 LF DBL 593-96LF

## (undated) DEVICE — CUSTOM PACK TOTAL KNEE SOP5BTKHEC

## (undated) DEVICE — CUFF TOURN 34IN STRL DISP

## (undated) DEVICE — GOWN IMPERVIOUS BREATHABLE SMART LG 89015

## (undated) DEVICE — DRSG AQUACEL AG HYDROFIBER  3.5X10" 422605

## (undated) DEVICE — SUTURE VICRYL+ 2-0 27IN CT-1 UND VCP259H

## (undated) DEVICE — HOLDER LIMB VELCRO OR 0814-1533

## (undated) DEVICE — SU STRATAFIX MONOCRYL 3-0 SPIRAL PS-2 30CM SXMP1B106

## (undated) DEVICE — SOL WATER IRRIG 1000ML BOTTLE 2F7114

## (undated) DEVICE — SU PDO 1 STRATAFIX 36X36CM CTX TAPERPOINT SXPD2B405

## (undated) DEVICE — SOL NACL 0.9% INJ 1000ML BAG 2B1324X

## (undated) DEVICE — GOWN XXL 9575

## (undated) DEVICE — GLOVE UNDER INDICATOR PI SZ 7.0 LF 41670

## (undated) DEVICE — DECANTER VIAL 2006S

## (undated) DEVICE — SUCTION MANIFOLD NEPTUNE 2 SYS 4 PORT 0702-020-000

## (undated) RX ORDER — ONDANSETRON 2 MG/ML
INJECTION INTRAMUSCULAR; INTRAVENOUS
Status: DISPENSED
Start: 2022-12-20

## (undated) RX ORDER — FENTANYL CITRATE 50 UG/ML
INJECTION, SOLUTION INTRAMUSCULAR; INTRAVENOUS
Status: DISPENSED
Start: 2022-12-20

## (undated) RX ORDER — PROPOFOL 10 MG/ML
INJECTION, EMULSION INTRAVENOUS
Status: DISPENSED
Start: 2022-12-20

## (undated) RX ORDER — LIDOCAINE HYDROCHLORIDE 10 MG/ML
INJECTION, SOLUTION EPIDURAL; INFILTRATION; INTRACAUDAL; PERINEURAL
Status: DISPENSED
Start: 2022-12-20

## (undated) RX ORDER — DEXAMETHASONE SODIUM PHOSPHATE 10 MG/ML
INJECTION, EMULSION INTRAMUSCULAR; INTRAVENOUS
Status: DISPENSED
Start: 2022-12-20